# Patient Record
Sex: FEMALE | Race: WHITE | Employment: FULL TIME | URBAN - METROPOLITAN AREA
[De-identification: names, ages, dates, MRNs, and addresses within clinical notes are randomized per-mention and may not be internally consistent; named-entity substitution may affect disease eponyms.]

---

## 2017-01-10 ENCOUNTER — HOSPITAL ENCOUNTER (OUTPATIENT)
Dept: ULTRASOUND IMAGING | Age: 52
Discharge: HOME | End: 2017-01-10
Attending: INTERNAL MEDICINE
Payer: COMMERCIAL

## 2017-01-10 DIAGNOSIS — I65.21 CAROTID STENOSIS, RIGHT: ICD-10-CM

## 2017-01-10 PROCEDURE — 93880 EXTRACRANIAL BILAT STUDY: CPT

## 2017-04-12 ENCOUNTER — HOSPITAL ENCOUNTER (OUTPATIENT)
Dept: SURGERY | Age: 52
Discharge: HOME | End: 2017-04-12

## 2017-04-12 VITALS
HEIGHT: 64 IN | WEIGHT: 184.38 LBS | TEMPERATURE: 98.1 F | RESPIRATION RATE: 16 BRPM | DIASTOLIC BLOOD PRESSURE: 56 MMHG | BODY MASS INDEX: 31.48 KG/M2 | OXYGEN SATURATION: 98 % | SYSTOLIC BLOOD PRESSURE: 126 MMHG

## 2017-04-12 NOTE — PERIOP NOTES
Patient verified name, , and surgery as listed in [facility]. TYPE  CASE:1b  Orders per surgeon: were not Received  Labs per surgeon:none  Labs per anesthesia protocol: none   EKG  :  Ekg 17 with comparison EKG 01/06/15, Echo 12, last office note 17. Patient provided with handouts including guide to surgery , transfusions, pain management and hand hygiene for the family and community. Pt verbalizes understanding of all pre-op instructions . Instructed that family must be present in building at all times. Hibiclens and instructions given per hospital policy. Instructed patient to continue  previous medications as prescribed prior to surgery and  to take the following medications the day of surgery according to anesthesia guidelines : Synthroid       Original medication prescription bottles were not visualized during patient appointment. Continue all previous medications unless otherwise directed. Instructed patient to hold  the following medications prior to surgery: none      Patient verbalized understanding of all instructions and provided all medical/health information to the best of their ability.

## 2017-04-18 NOTE — PERIOP NOTES
Called and left message for Elizabeth Doctor  To please fax orders and H&P to [phone]  Consent orders in sign and held

## 2017-04-18 NOTE — H&P
History and Physical    Subjective:     Glenys Hampton is a 46 y.o.  female who presents with ptosis. Onset of symptoms was gradual with gradually worsening course since that time. 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[facility])     surgically removed    Fatty liver     H/O wheezing     usually after a respiratory infection, uses prn inhaler    Heart palpitations     occassionally with no treatment     Hx gestational diabetes     no symptoms after childbirth    Hypercholesterolemia     managed with medication     Hypothyroidism     managed with medication     IBS (irritable bowel syndrome)     managed with diet and prn medication     Osteoarthritis     fingers     PUD (peptic ulcer disease) last     Gastric ulcers/duodenal carcinoid, 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    Splenic artery aneurysm ([facility] [address] 2015    5/21/15-  1 cm, Vascular consulted, no treatment at this time.       Past Surgical History:   Procedure Laterality Date    HX BREAST REDUCTION  2014    BILATERAL BREAST REDUCTION performed by Jaxon Souza MD at [address] HX  SECTION  83, 80    x 2    HX COLONOSCOPY      HX ENDOSCOPY  2015    HX HEENT      jaw surgery x2    HX HYSTERECTOMY  2008    complete with bso    HX LAP CHOLECYSTECTOMY      HX ORTHOPAEDIC Right     middle finger surgery joint    HX SHOULDER ARTHROSCOPY Right 2013         HX TONSIL AND ADENOIDECTOMY       Family History   Problem Relation Age of Onset    Heart Disease Mother     Arthritis-osteo Mother     COPD Mother     Stroke Mother     Diabetes Father     Heart Disease Father     Hypertension Father     No Known Problems Brother       Social History   Substance Use Topics    Smoking status: Former Smoker     Packs/day: 0.50     Years: 5.00     Quit date: 8/10/1980    Smokeless tobacco: Never Used      Comment: quit 25 years ago / in her 19's    Alcohol use No       Prior to Admission medications    Medication Sig Start Date End Date Taking? Authorizing Provider   levothyroxine (SYNTHROID) 88 mcg tablet Take 88 mcg by mouth Daily (before breakfast). Historical Provider   alosetron (LOTRONEX) 0.5 mg tab Take  by mouth as needed. Indications: DIARRHEA PREDOMINANT IRRITABLE BOWEL SYNDROME    Historical Provider   fenofibric acid (TRILIPIX) 45 mg capsule Take 45 mg by mouth every morning. Indications: hypercholesterolemia    Phys Other, MD   colestipol (COLESTID) 1 gram tablet Take 1 g by mouth every evening. Historical Provider     Allergies   Allergen Reactions    Erythromycin Nausea and Vomiting    Penicillins Hives        Review of Systems:  A comprehensive review of systems was negative. Objective: Intake and Output:            Physical Exam:   There were no vitals taken for this visit. General:  Alert, cooperative, no distress, appears stated age. Head:  Normocephalic, without obvious abnormality, atraumatic. Eyes:  Conjunctivae/corneas clear. PERRL, EOMs intact. Fundi benign. Ears:  Normal TMs and external ear canals both ears. Nose: Nares normal. Septum midline. Mucosa normal. No drainage or sinus tenderness. Throat: Lips, mucosa, and tongue normal. Teeth and gums normal.   Neck: Supple, symmetrical, trachea midline, no adenopathy, thyroid: no enlargement/tenderness/nodules, no carotid bruit and no JVD. Back:   Symmetric, no curvature. ROM normal. No CVA tenderness. Lungs:   Clear to auscultation bilaterally. Chest wall:  No tenderness or deformity. Heart:  Regular rate and rhythm, S1, S2 normal, no murmur, click, rub or gallop. Breast Exam:  No tenderness, masses, or nipple abnormality. Abdomen:   Soft, non-tender. Bowel sounds normal. No masses,  No organomegaly. Genitalia:  Normal female without lesion, discharge or tenderness. Rectal:  Normal tone,  no masses or tenderness  Guaiac negative stool. Extremities: Extremities normal, atraumatic, no cyanosis or edema. Pulses: 2+ and symmetric all extremities. Skin: Skin color, texture, turgor normal. No rashes or lesions. Lymph nodes: Cervical, supraclavicular, and axillary nodes normal.   Neurologic: CNII-XII intact. Normal strength, sensation and reflexes throughout. Data Review:   No results found for this or any previous visit (from the past 24 hour(s)). Assessment:     Active Problems:    * No active hospital problems.  *      Plan:     Ptosis repair both upper eyelids    Signed By: Ness Flannery DO     April 18, 2017

## 2017-04-19 ENCOUNTER — HOSPITAL ENCOUNTER (OUTPATIENT)
Dept: MAMMOGRAPHY | Age: 52
Discharge: HOME | End: 2017-04-19
Attending: INTERNAL MEDICINE
Payer: COMMERCIAL

## 2017-04-19 DIAGNOSIS — Z12.31 VISIT FOR SCREENING MAMMOGRAM: ICD-10-CM

## 2017-04-19 PROCEDURE — 77067 SCR MAMMO BI INCL CAD: CPT

## 2017-04-20 ENCOUNTER — ANESTHESIA EVENT (OUTPATIENT)
Dept: SURGERY | Age: 52
End: 2017-04-20
Payer: COMMERCIAL

## 2017-04-20 RX ORDER — FENTANYL CITRATE 50 UG/ML
100 INJECTION, SOLUTION INTRAMUSCULAR; INTRAVENOUS ONCE
Status: CANCELLED | OUTPATIENT
Start: 2017-04-20 | End: 2017-04-20

## 2017-04-20 NOTE — PERIOP NOTES
Pt states she is supposed to have Sedonia Lyell with her tomorrow. Carlos PIRES spoke to pt concerning this matter.

## 2017-04-21 ENCOUNTER — ANESTHESIA (OUTPATIENT)
Dept: SURGERY | Age: 52
End: 2017-04-21
Payer: COMMERCIAL

## 2017-04-21 ENCOUNTER — HOSPITAL ENCOUNTER (OUTPATIENT)
Age: 52
Setting detail: OUTPATIENT SURGERY
Discharge: HOME | End: 2017-04-21
Attending: OPHTHALMOLOGY | Admitting: OPHTHALMOLOGY
Payer: COMMERCIAL

## 2017-04-21 VITALS
BODY MASS INDEX: 31.3 KG/M2 | DIASTOLIC BLOOD PRESSURE: 75 MMHG | RESPIRATION RATE: 12 BRPM | HEART RATE: 68 BPM | SYSTOLIC BLOOD PRESSURE: 129 MMHG | OXYGEN SATURATION: 96 % | TEMPERATURE: 98 F | WEIGHT: 182.38 LBS

## 2017-04-21 PROCEDURE — 77030020851 HC CAUT BTRY HI AARM -A: Performed by: OPHTHALMOLOGY

## 2017-04-21 PROCEDURE — 76210000020 HC REC RM PH II FIRST 0.5 HR: Performed by: OPHTHALMOLOGY

## 2017-04-21 PROCEDURE — 76010000138 HC OR TIME 0.5 TO 1 HR: Performed by: OPHTHALMOLOGY

## 2017-04-21 PROCEDURE — 74011250636 HC RX REV CODE- 250/636

## 2017-04-21 PROCEDURE — 74011000250 HC RX REV CODE- 250: Performed by: OPHTHALMOLOGY

## 2017-04-21 PROCEDURE — 74011250636 HC RX REV CODE- 250/636: Performed by: ANESTHESIOLOGY

## 2017-04-21 PROCEDURE — 76210000063 HC OR PH I REC FIRST 0.5 HR: Performed by: OPHTHALMOLOGY

## 2017-04-21 PROCEDURE — 77030002996 HC SUT SLK J&J -A: Performed by: OPHTHALMOLOGY

## 2017-04-21 PROCEDURE — 77030002975 HC SUT PLN J&J -B: Performed by: OPHTHALMOLOGY

## 2017-04-21 PROCEDURE — 77030013474 HC CRD BPLR DISP ADLR -A: Performed by: OPHTHALMOLOGY

## 2017-04-21 PROCEDURE — 76060000033 HC ANESTHESIA 1 TO 1.5 HR: Performed by: OPHTHALMOLOGY

## 2017-04-21 PROCEDURE — 77030018836 HC SOL IRR NACL ICUM -A: Performed by: OPHTHALMOLOGY

## 2017-04-21 PROCEDURE — 77030003029 HC SUT VCRL J&J -B: Performed by: OPHTHALMOLOGY

## 2017-04-21 PROCEDURE — 74011000250 HC RX REV CODE- 250

## 2017-04-21 RX ORDER — ACETAMINOPHEN 500 MG
1000 TABLET ORAL ONCE
Status: DISCONTINUED | OUTPATIENT
Start: 2017-04-21 | End: 2017-04-21 | Stop reason: HOSPADM

## 2017-04-21 RX ORDER — MIDAZOLAM HYDROCHLORIDE 1 MG/ML
INJECTION, SOLUTION INTRAMUSCULAR; INTRAVENOUS AS NEEDED
Status: DISCONTINUED | OUTPATIENT
Start: 2017-04-21 | End: 2017-04-21 | Stop reason: HOSPADM

## 2017-04-21 RX ORDER — ONDANSETRON 2 MG/ML
INJECTION INTRAMUSCULAR; INTRAVENOUS AS NEEDED
Status: DISCONTINUED | OUTPATIENT
Start: 2017-04-21 | End: 2017-04-21 | Stop reason: HOSPADM

## 2017-04-21 RX ORDER — LIDOCAINE HYDROCHLORIDE 10 MG/ML
0.1 INJECTION INFILTRATION; PERINEURAL AS NEEDED
Status: DISCONTINUED | OUTPATIENT
Start: 2017-04-21 | End: 2017-04-21 | Stop reason: HOSPADM

## 2017-04-21 RX ORDER — OXYCODONE HYDROCHLORIDE 5 MG/1
10 TABLET ORAL
Status: DISCONTINUED | OUTPATIENT
Start: 2017-04-21 | End: 2017-04-21 | Stop reason: HOSPADM

## 2017-04-21 RX ORDER — SODIUM CHLORIDE 0.9 % (FLUSH) 0.9 %
5-10 SYRINGE (ML) INJECTION AS NEEDED
Status: DISCONTINUED | OUTPATIENT
Start: 2017-04-21 | End: 2017-04-21 | Stop reason: HOSPADM

## 2017-04-21 RX ORDER — HYDROMORPHONE HYDROCHLORIDE 2 MG/ML
0.5 INJECTION, SOLUTION INTRAMUSCULAR; INTRAVENOUS; SUBCUTANEOUS
Status: DISCONTINUED | OUTPATIENT
Start: 2017-04-21 | End: 2017-04-21 | Stop reason: HOSPADM

## 2017-04-21 RX ORDER — SODIUM CHLORIDE 0.9 % (FLUSH) 0.9 %
5-10 SYRINGE (ML) INJECTION EVERY 8 HOURS
Status: DISCONTINUED | OUTPATIENT
Start: 2017-04-21 | End: 2017-04-21 | Stop reason: HOSPADM

## 2017-04-21 RX ORDER — BUPIVACAINE HYDROCHLORIDE AND EPINEPHRINE 5; 5 MG/ML; UG/ML
INJECTION, SOLUTION EPIDURAL; INTRACAUDAL; PERINEURAL AS NEEDED
Status: DISCONTINUED | OUTPATIENT
Start: 2017-04-21 | End: 2017-04-21 | Stop reason: HOSPADM

## 2017-04-21 RX ORDER — PROPOFOL 10 MG/ML
INJECTION, EMULSION INTRAVENOUS AS NEEDED
Status: DISCONTINUED | OUTPATIENT
Start: 2017-04-21 | End: 2017-04-21 | Stop reason: HOSPADM

## 2017-04-21 RX ORDER — FENTANYL CITRATE 50 UG/ML
INJECTION, SOLUTION INTRAMUSCULAR; INTRAVENOUS AS NEEDED
Status: DISCONTINUED | OUTPATIENT
Start: 2017-04-21 | End: 2017-04-21 | Stop reason: HOSPADM

## 2017-04-21 RX ORDER — OXYCODONE HYDROCHLORIDE 5 MG/1
5 TABLET ORAL
Status: DISCONTINUED | OUTPATIENT
Start: 2017-04-21 | End: 2017-04-21 | Stop reason: HOSPADM

## 2017-04-21 RX ORDER — SODIUM CHLORIDE, SODIUM LACTATE, POTASSIUM CHLORIDE, CALCIUM CHLORIDE 600; 310; 30; 20 MG/100ML; MG/100ML; MG/100ML; MG/100ML
100 INJECTION, SOLUTION INTRAVENOUS CONTINUOUS
Status: DISCONTINUED | OUTPATIENT
Start: 2017-04-21 | End: 2017-04-21 | Stop reason: HOSPADM

## 2017-04-21 RX ORDER — FLUMAZENIL 0.1 MG/ML
0.2 INJECTION INTRAVENOUS
Status: DISCONTINUED | OUTPATIENT
Start: 2017-04-21 | End: 2017-04-21 | Stop reason: HOSPADM

## 2017-04-21 RX ORDER — NALOXONE HYDROCHLORIDE 0.4 MG/ML
0.2 INJECTION, SOLUTION INTRAMUSCULAR; INTRAVENOUS; SUBCUTANEOUS AS NEEDED
Status: DISCONTINUED | OUTPATIENT
Start: 2017-04-21 | End: 2017-04-21 | Stop reason: HOSPADM

## 2017-04-21 RX ORDER — LIDOCAINE HYDROCHLORIDE AND EPINEPHRINE 10; 10 MG/ML; UG/ML
INJECTION, SOLUTION INFILTRATION; PERINEURAL AS NEEDED
Status: DISCONTINUED | OUTPATIENT
Start: 2017-04-21 | End: 2017-04-21 | Stop reason: HOSPADM

## 2017-04-21 RX ORDER — MIDAZOLAM HYDROCHLORIDE 1 MG/ML
2 INJECTION, SOLUTION INTRAMUSCULAR; INTRAVENOUS ONCE
Status: DISCONTINUED | OUTPATIENT
Start: 2017-04-21 | End: 2017-04-21 | Stop reason: HOSPADM

## 2017-04-21 RX ORDER — LIDOCAINE HYDROCHLORIDE 20 MG/ML
INJECTION, SOLUTION EPIDURAL; INFILTRATION; INTRACAUDAL; PERINEURAL AS NEEDED
Status: DISCONTINUED | OUTPATIENT
Start: 2017-04-21 | End: 2017-04-21 | Stop reason: HOSPADM

## 2017-04-21 RX ORDER — DIPHENHYDRAMINE HYDROCHLORIDE 50 MG/ML
12.5 INJECTION, SOLUTION INTRAMUSCULAR; INTRAVENOUS
Status: DISCONTINUED | OUTPATIENT
Start: 2017-04-21 | End: 2017-04-21 | Stop reason: HOSPADM

## 2017-04-21 RX ORDER — MIDAZOLAM HYDROCHLORIDE 1 MG/ML
2 INJECTION, SOLUTION INTRAMUSCULAR; INTRAVENOUS
Status: DISCONTINUED | OUTPATIENT
Start: 2017-04-21 | End: 2017-04-21 | Stop reason: HOSPADM

## 2017-04-21 RX ADMIN — SODIUM CHLORIDE, SODIUM LACTATE, POTASSIUM CHLORIDE, AND CALCIUM CHLORIDE 100 ML/HR: 600; 310; 30; 20 INJECTION, SOLUTION INTRAVENOUS at 13:17

## 2017-04-21 RX ADMIN — LIDOCAINE HYDROCHLORIDE 30 MG: 20 INJECTION, SOLUTION EPIDURAL; INFILTRATION; INTRACAUDAL; PERINEURAL at 14:11

## 2017-04-21 RX ADMIN — PROPOFOL 30 MG: 10 INJECTION, EMULSION INTRAVENOUS at 14:32

## 2017-04-21 RX ADMIN — PROPOFOL 50 MG: 10 INJECTION, EMULSION INTRAVENOUS at 14:13

## 2017-04-21 RX ADMIN — PROPOFOL 30 MG: 10 INJECTION, EMULSION INTRAVENOUS at 14:30

## 2017-04-21 RX ADMIN — FENTANYL CITRATE 25 MCG: 50 INJECTION, SOLUTION INTRAMUSCULAR; INTRAVENOUS at 14:59

## 2017-04-21 RX ADMIN — MIDAZOLAM HYDROCHLORIDE 0.5 MG: 1 INJECTION, SOLUTION INTRAMUSCULAR; INTRAVENOUS at 14:55

## 2017-04-21 RX ADMIN — PROPOFOL 100 MG: 10 INJECTION, EMULSION INTRAVENOUS at 14:11

## 2017-04-21 RX ADMIN — FENTANYL CITRATE 25 MCG: 50 INJECTION, SOLUTION INTRAMUSCULAR; INTRAVENOUS at 14:54

## 2017-04-21 RX ADMIN — ONDANSETRON 4 MG: 2 INJECTION INTRAMUSCULAR; INTRAVENOUS at 14:47

## 2017-04-21 NOTE — OP NOTES
PREOPERATIVE DIAGNOSIS:  Ptosis both upper eyelids with visual obstruction, and excess      eyelid skin  POSTOPERATIVE DIAGNOSIS: Same  PROCEDURE:    Repair ptosis of both upper eyelids (external levator technique)      and excision of excess skin  SURGEON:    Raul Davidson D.O. ANESTHESIA:    MAC / Local  BLOOD LOSS:    < 2 CC  COMPLICATIONS:   None    Description of Procedure: The patient was taken to the operating room to repair ptosis of the both upper eyelids causing visual obstruction. The procedure was reviewed in detail with the patient including risks, complications, benefits, and goals. Informed consent was obtained after all questions were answered. Time out procedure was carried out to confirm proper surgical site. The lid crease positions were marked with a marking pen while the patient was sitting up. The patient was placed supine and 1.0% Lidocaine with epinephrine was mixed in a 1:1 ratio with 0.5% Marcaine with epinephrine and this was injected as local anesthesia. The patient was then prepped and draped in the usual fashion for ocular surgery. After the patient was placed in supine position, a #15 blade was used to make an incision in the right upper lid. This was carried into the orbicularis muscle. The skin edges were tented and Amrita scissors were used to make an incision through the orbicularis muscle. Dissection was then carried through the septum exposing the levator aponeurosis. The levator was noted to be disinserted. The pretarsal orbicularis muscle was then excised using Amrita scissors. Bleeding was controlled easily using handheld and bipolar cautery. The levator was dissected free of Daniel's muscle, and using a double-armed, broad-based, 6-0 Mersiline suture,  the levator was reattached to the tarsal plate. A skin muscle flap was then removed using Amrita scissors with attention turned to symmetry and contour for the eyelids.     Attention was turned to the left upper lid where the procedure was carried out in a very similar fashion. After the sutures were applied, the patient was assisted into a sitting position. Here the lid heights and contours were checked and adjusted by changing tension on the sutures. Once the appropriate heights and contours of the lids were achieved, the suture was tied and the tails cut. The skin edges were then closed using multiple 6-0 plain gut interrupted and running sutures. Deep bites were taken to ensure a very good lid crease. Tobradex ointment and a sterile dressing were applied to the wounds, and the patient was taken to recovery in stable condition. Discharge instructions were given, and the patient will be seen in the office one week following surgery for evaluation.

## 2017-04-21 NOTE — DISCHARGE INSTRUCTIONS
Donato Funez D.O. [facility] [employer]  [phone]    INSTRUCTIONS AFTER YOUR SURGERY      1. For the first 24 hours after surgery:   A. Use ice packs for 15 - 20 minutes every hour until bedtime, then as needed. B.  Do not lift heavy objects or place a strain on your body. C.  Do not engage in important business, drink alcoholic beverages, or drive. Taylor Burnside your head elevated for the first evening or two.   E.  Have a friend or relative with you. F.  Do not rub your eyes    2. Call Dr. Shivani Marinelli if any of the following occur:   A. Sudden loss of vision. Cover one eye at a time to check vision. B.  Excessive bleeding or blood-soaked bandage. NOTE:  Small amounts of bleeding or blood-tinged tears are normal.   C.  Redness, warmth, or hardness around the wound. D.  Fever greater than 100°F.  E.  Pain not relieved by Tylenol, Ibuprofen, or pain relievers prescribed by               Doctor Chen. 3.  During the next few days after surgery:   A. Use ice packs or cold moist compresses several times a day. B.  Resume your normal activities VERY SLOWLY, no heavy exercise. C.  Resume your normal diet; avoid overly spicy or greasy foods for a few days. D.  Resume taking your normal prescription medications and/or eyedrops. Dylan Juarez the day after surgery, clean shower water will not hurt the wounds. Camden López may wear makeup after 7 - 10 days on the incisions, not before. G.  Remove the bandage or dressing in    hours. H.  Use the following medications carefully and as directed:         Use eye ointment on stitches twice a day. NOTE:  eye ointment may cause blurry vision. .     I. Special instructions:                              .    4. Your postop visit with Dr. Shivani Marinelli is scheduled on 04/27/17  at 2:45 pm at his Office.   Call our office if you have any questions, concerns, or problems after your surgery .    [phone]  [phone] [facility]

## 2017-04-21 NOTE — ANESTHESIA POSTPROCEDURE EVALUATION
Post-Anesthesia Evaluation and Assessment    Patient: Enrique Kirkpatrick MRN: [id number]  SSN: [id number]    YOB: 1965  Age: 46 y.o. Sex: female       Cardiovascular Function/Vital Signs  Visit Vitals    /70    Pulse 72    Temp 36.7 °C (98 °F)    Resp 14    Wt 82.7 kg (182 lb 6 oz)    SpO2 93%    BMI 31.3 kg/m2       Patient is status post total IV anesthesia anesthesia for Procedure(s):  BLEPHAROPTOSIS BILATERAL/. Nausea/Vomiting: None    Postoperative hydration reviewed and adequate. Pain:  Pain Scale 1: Numeric (0 - 10) (04/21/17 1513)  Pain Intensity 1: 0 (04/21/17 1513)   Managed    Neurological Status:   Neuro (WDL): Within Defined Limits (04/21/17 1513)  Neuro  LUE Motor Response: Purposeful (04/21/17 1513)  LLE Motor Response: Purposeful (04/21/17 1513)  RUE Motor Response: Purposeful (04/21/17 1513)  RLE Motor Response: Purposeful (04/21/17 1513)   At baseline    Mental Status and Level of Consciousness: Alert and oriented     Pulmonary Status:   O2 Device: Room air (04/21/17 1513)   Adequate oxygenation and airway patent    Complications related to anesthesia: None    Post-anesthesia assessment completed.  No concerns    Signed By: Heather Solomon MD     April 21, 2017

## 2017-04-21 NOTE — INTERVAL H&P NOTE
H&P Update:  Lor Davies was seen and examined. History and physical has been reviewed. The patient has been examined.  There have been no significant clinical changes since the completion of the originally dated History and Physical.    Signed By: Davon Narayanan DO     April 21, 2017 2:01 PM

## 2017-05-04 ENCOUNTER — HOSPITAL ENCOUNTER (OUTPATIENT)
Dept: LAB | Age: 52
Discharge: HOME | End: 2017-05-04
Attending: INTERNAL MEDICINE
Payer: COMMERCIAL

## 2017-05-04 ENCOUNTER — HOSPITAL ENCOUNTER (OUTPATIENT)
Dept: CT IMAGING | Age: 52
Discharge: HOME | End: 2017-05-04
Attending: INTERNAL MEDICINE
Payer: SELF-PAY

## 2017-05-04 DIAGNOSIS — R07.2 PRECORDIAL CHEST PAIN: ICD-10-CM

## 2017-05-04 DIAGNOSIS — R01.1 MURMUR, CARDIAC: ICD-10-CM

## 2017-05-04 DIAGNOSIS — R01.1 MURMUR: ICD-10-CM

## 2017-05-04 LAB
ANION GAP BLD CALC-SCNC: 6 MMOL/L
BASOPHILS # BLD AUTO: 0 K/UL (ref 0–0.2)
BASOPHILS # BLD: 1 % (ref 0–2)
BUN SERPL-MCNC: 18 MG/DL (ref 6–23)
CALCIUM SERPL-MCNC: 8.9 MG/DL (ref 8.3–10.4)
CHLORIDE SERPL-SCNC: 108 MMOL/L (ref 98–107)
CHOLEST SERPL-MCNC: 237 MG/DL
CO2 SERPL-SCNC: 28 MMOL/L (ref 21–32)
CREAT SERPL-MCNC: 0.9 MG/DL (ref 0.6–1)
DIFFERENTIAL METHOD BLD: ABNORMAL
EOSINOPHIL # BLD: 0.2 K/UL (ref 0–0.8)
EOSINOPHIL NFR BLD: 5 % (ref 0.5–7.8)
ERYTHROCYTE [DISTWIDTH] IN BLOOD BY AUTOMATED COUNT: 13.8 % (ref 11.9–14.6)
EST. AVERAGE GLUCOSE BLD GHB EST-MCNC: 123 MG/DL
GLUCOSE SERPL-MCNC: 111 MG/DL (ref 65–100)
HBA1C MFR BLD: 5.9 % (ref 4.8–6)
HCT VFR BLD AUTO: 40.9 % (ref 35.8–46.3)
HDLC SERPL-MCNC: 34 MG/DL (ref 40–60)
HDLC SERPL: 7 {RATIO}
HGB BLD-MCNC: 12.9 G/DL (ref 11.7–15.4)
LDLC SERPL CALC-MCNC: ABNORMAL MG/DL
LDLC SERPL DIRECT ASSAY-MCNC: 143 MG/DL
LIPID PROFILE,FLP: ABNORMAL
LYMPHOCYTES # BLD AUTO: 47 % (ref 13–44)
LYMPHOCYTES # BLD: 1.8 K/UL (ref 0.5–4.6)
MCH RBC QN AUTO: 27.9 PG (ref 26.1–32.9)
MCHC RBC AUTO-ENTMCNC: 31.5 G/DL (ref 31.4–35)
MCV RBC AUTO: 88.5 FL (ref 79.6–97.8)
MONOCYTES # BLD: 0.4 K/UL (ref 0.1–1.3)
MONOCYTES NFR BLD AUTO: 11 % (ref 4–12)
NEUTS SEG # BLD: 1.4 K/UL (ref 1.7–8.2)
NEUTS SEG NFR BLD AUTO: 36 % (ref 43–78)
PLATELET # BLD AUTO: 228 K/UL (ref 150–450)
PMV BLD AUTO: 10.8 FL (ref 10.8–14.1)
POTASSIUM SERPL-SCNC: 4 MMOL/L (ref 3.5–5.1)
RBC # BLD AUTO: 4.62 M/UL (ref 4.05–5.25)
SODIUM SERPL-SCNC: 142 MMOL/L (ref 136–145)
T4 FREE SERPL-MCNC: 1.3 NG/DL (ref 0.78–1.46)
TRIGL SERPL-MCNC: 440 MG/DL (ref 35–150)
TSH SERPL DL<=0.005 MIU/L-ACNC: 1.44 UIU/ML (ref 0.36–3.74)
VLDLC SERPL CALC-MCNC: 88 MG/DL (ref 6–23)
WBC # BLD AUTO: 3.9 K/UL (ref 4.3–11.1)

## 2017-05-04 PROCEDURE — 80048 BASIC METABOLIC PNL TOTAL CA: CPT | Performed by: INTERNAL MEDICINE

## 2017-05-04 PROCEDURE — 84443 ASSAY THYROID STIM HORMONE: CPT | Performed by: INTERNAL MEDICINE

## 2017-05-04 PROCEDURE — 84439 ASSAY OF FREE THYROXINE: CPT | Performed by: INTERNAL MEDICINE

## 2017-05-04 PROCEDURE — 83721 ASSAY OF BLOOD LIPOPROTEIN: CPT | Performed by: INTERNAL MEDICINE

## 2017-05-04 PROCEDURE — 82306 VITAMIN D 25 HYDROXY: CPT | Performed by: INTERNAL MEDICINE

## 2017-05-04 PROCEDURE — 36415 COLL VENOUS BLD VENIPUNCTURE: CPT | Performed by: INTERNAL MEDICINE

## 2017-05-04 PROCEDURE — 75571 CT HRT W/O DYE W/CA TEST: CPT

## 2017-05-04 PROCEDURE — 80061 LIPID PANEL: CPT | Performed by: INTERNAL MEDICINE

## 2017-05-04 PROCEDURE — 85025 COMPLETE CBC W/AUTO DIFF WBC: CPT | Performed by: INTERNAL MEDICINE

## 2017-05-04 PROCEDURE — 83036 HEMOGLOBIN GLYCOSYLATED A1C: CPT | Performed by: INTERNAL MEDICINE

## 2017-05-05 LAB — 25(OH)D3+25(OH)D2 SERPL-MCNC: 16.6 NG/ML (ref 30–100)

## 2017-05-17 ENCOUNTER — APPOINTMENT (OUTPATIENT)
Dept: PHYSICAL THERAPY | Age: 52
End: 2017-05-17

## 2017-05-24 ENCOUNTER — APPOINTMENT (OUTPATIENT)
Dept: PHYSICAL THERAPY | Age: 52
End: 2017-05-24

## 2017-07-25 ENCOUNTER — HOSPITAL ENCOUNTER (OUTPATIENT)
Dept: MRI IMAGING | Age: 52
Discharge: HOME | End: 2017-07-25
Attending: INTERNAL MEDICINE
Payer: COMMERCIAL

## 2017-07-25 DIAGNOSIS — R51.9 GENERALIZED HEADACHE: ICD-10-CM

## 2017-07-25 PROCEDURE — 70551 MRI BRAIN STEM W/O DYE: CPT

## 2017-12-04 ENCOUNTER — PATIENT OUTREACH (OUTPATIENT)
Dept: OTHER | Age: 52
End: 2017-12-04

## 2017-12-04 NOTE — PROGRESS NOTES
Patient on report as eligible for Case Management. Left discreet message on voicemail with this CM contact information. Will attempt to contact again to offer [phone] Management services.

## 2017-12-07 ENCOUNTER — HOSPITAL ENCOUNTER (OUTPATIENT)
Dept: ULTRASOUND IMAGING | Age: 52
Discharge: HOME | End: 2017-12-07
Attending: INTERNAL MEDICINE
Payer: COMMERCIAL

## 2017-12-07 DIAGNOSIS — R79.89 ABNORMAL LFTS: ICD-10-CM

## 2017-12-07 PROCEDURE — 76700 US EXAM ABDOM COMPLETE: CPT

## 2017-12-13 ENCOUNTER — PATIENT OUTREACH (OUTPATIENT)
Dept: OTHER | Age: 52
End: 2017-12-13

## 2017-12-13 NOTE — LETTER
12/13/2017 1:17 PM 
 
Ms. Enma Casas [address] [facility] [place] [address] Dear Ms. Winifred Cranker, My name is Patricia Rodrigez RN, Employee Care Manager for [facility] and I have been trying to reach you. The Employee Care  is a free-of-charge confidential service provided to our employees and their family members covered by the [facility]. The program will provide an employee and his/her family with the [facility] expertise to assist in navigating the health care delivery system, provider services, and their overall care needsso as to assure and improve health care interactions and enhance the quality of life. This program is designed to provide you with the opportunity to have a [facility] care manager partner with you for the following services: 
 
1.) Care transitions-such as when you come home from the hospital 
2.) When help is needed to manage your disease process 3.) When you are faced with managing a chronic or complex medical condition [facility] is dedicated to empowering the good health of its community and improving the quality of care and care experiences for employees and their families. We are commited to safeguarding patient confidentiality and privacy,  assuring that every employee has the respect he or she deserves in managing their health. The information shared with your care manager will not be shared with anyone else aside from those you identify as part of your care team, and will only be used to assist you with any identified care needs. Please contact me if you would like this service provided to you. Sincerely, Patricia Rodrigez RN, BSN  Employee Care Manager [address] [email or web address]

## 2017-12-13 NOTE — PROGRESS NOTES
Patient identified as eligible for [address] [place] [address] services. Second telephone outreach attempted. Left discreet voicemail with this CM confidential contact information. Will send UTR letter.

## 2018-01-10 ENCOUNTER — ANESTHESIA EVENT (OUTPATIENT)
Dept: ENDOSCOPY | Age: 53
End: 2018-01-10
Payer: COMMERCIAL

## 2018-01-10 RX ORDER — DIPHENHYDRAMINE HYDROCHLORIDE 50 MG/ML
12.5 INJECTION, SOLUTION INTRAMUSCULAR; INTRAVENOUS ONCE
Status: CANCELLED | OUTPATIENT
Start: 2018-01-10 | End: 2018-01-10

## 2018-01-10 RX ORDER — SODIUM CHLORIDE 0.9 % (FLUSH) 0.9 %
5-10 SYRINGE (ML) INJECTION AS NEEDED
Status: CANCELLED | OUTPATIENT
Start: 2018-01-10

## 2018-01-10 RX ORDER — ACETAMINOPHEN 500 MG
500 TABLET ORAL ONCE
Status: CANCELLED | OUTPATIENT
Start: 2018-01-10 | End: 2018-01-10

## 2018-01-10 RX ORDER — OXYCODONE HYDROCHLORIDE 5 MG/1
10 TABLET ORAL
Status: CANCELLED | OUTPATIENT
Start: 2018-01-10

## 2018-01-10 RX ORDER — OXYCODONE HYDROCHLORIDE 5 MG/1
5 TABLET ORAL
Status: CANCELLED | OUTPATIENT
Start: 2018-01-10

## 2018-01-10 RX ORDER — SODIUM CHLORIDE, SODIUM LACTATE, POTASSIUM CHLORIDE, CALCIUM CHLORIDE 600; 310; 30; 20 MG/100ML; MG/100ML; MG/100ML; MG/100ML
75 INJECTION, SOLUTION INTRAVENOUS CONTINUOUS
Status: CANCELLED | OUTPATIENT
Start: 2018-01-10

## 2018-01-10 RX ORDER — HYDROMORPHONE HYDROCHLORIDE 2 MG/ML
0.5 INJECTION, SOLUTION INTRAMUSCULAR; INTRAVENOUS; SUBCUTANEOUS
Status: CANCELLED | OUTPATIENT
Start: 2018-01-10

## 2018-01-10 RX ORDER — ONDANSETRON 2 MG/ML
4 INJECTION INTRAMUSCULAR; INTRAVENOUS ONCE
Status: CANCELLED | OUTPATIENT
Start: 2018-01-10 | End: 2018-01-10

## 2018-01-10 RX ORDER — NALOXONE HYDROCHLORIDE 0.4 MG/ML
0.1 INJECTION, SOLUTION INTRAMUSCULAR; INTRAVENOUS; SUBCUTANEOUS AS NEEDED
Status: CANCELLED | OUTPATIENT
Start: 2018-01-10 | End: 2018-01-10

## 2018-01-11 ENCOUNTER — ANESTHESIA (OUTPATIENT)
Dept: ENDOSCOPY | Age: 53
End: 2018-01-11
Payer: COMMERCIAL

## 2018-01-11 ENCOUNTER — HOSPITAL ENCOUNTER (OUTPATIENT)
Age: 53
Setting detail: OUTPATIENT SURGERY
Discharge: HOME | End: 2018-01-11
Attending: INTERNAL MEDICINE | Admitting: INTERNAL MEDICINE
Payer: COMMERCIAL

## 2018-01-11 VITALS
BODY MASS INDEX: 30.73 KG/M2 | DIASTOLIC BLOOD PRESSURE: 55 MMHG | SYSTOLIC BLOOD PRESSURE: 118 MMHG | HEART RATE: 64 BPM | OXYGEN SATURATION: 94 % | HEIGHT: 64 IN | RESPIRATION RATE: 14 BRPM | TEMPERATURE: 97.7 F | WEIGHT: 180 LBS

## 2018-01-11 PROCEDURE — 76040000025: Performed by: INTERNAL MEDICINE

## 2018-01-11 PROCEDURE — 88305 TISSUE EXAM BY PATHOLOGIST: CPT | Performed by: INTERNAL MEDICINE

## 2018-01-11 PROCEDURE — 74011250636 HC RX REV CODE- 250/636: Performed by: ANESTHESIOLOGY

## 2018-01-11 PROCEDURE — 76060000031 HC ANESTHESIA FIRST 0.5 HR: Performed by: INTERNAL MEDICINE

## 2018-01-11 PROCEDURE — 74011250636 HC RX REV CODE- 250/636

## 2018-01-11 PROCEDURE — 74011000250 HC RX REV CODE- 250

## 2018-01-11 PROCEDURE — 77030009426 HC FCPS BIOP ENDOSC BSC -B: Performed by: INTERNAL MEDICINE

## 2018-01-11 RX ORDER — SODIUM CHLORIDE 0.9 % (FLUSH) 0.9 %
5-10 SYRINGE (ML) INJECTION AS NEEDED
Status: DISCONTINUED | OUTPATIENT
Start: 2018-01-11 | End: 2018-01-11 | Stop reason: HOSPADM

## 2018-01-11 RX ORDER — PROPOFOL 10 MG/ML
INJECTION, EMULSION INTRAVENOUS AS NEEDED
Status: DISCONTINUED | OUTPATIENT
Start: 2018-01-11 | End: 2018-01-11 | Stop reason: HOSPADM

## 2018-01-11 RX ORDER — SODIUM CHLORIDE 0.9 % (FLUSH) 0.9 %
5-10 SYRINGE (ML) INJECTION EVERY 8 HOURS
Status: DISCONTINUED | OUTPATIENT
Start: 2018-01-11 | End: 2018-01-11 | Stop reason: HOSPADM

## 2018-01-11 RX ORDER — PROPOFOL 10 MG/ML
INJECTION, EMULSION INTRAVENOUS
Status: DISCONTINUED | OUTPATIENT
Start: 2018-01-11 | End: 2018-01-11 | Stop reason: HOSPADM

## 2018-01-11 RX ORDER — FENTANYL CITRATE 50 UG/ML
100 INJECTION, SOLUTION INTRAMUSCULAR; INTRAVENOUS AS NEEDED
Status: DISCONTINUED | OUTPATIENT
Start: 2018-01-11 | End: 2018-01-11 | Stop reason: SDUPTHER

## 2018-01-11 RX ORDER — LIDOCAINE HYDROCHLORIDE 10 MG/ML
0.1 INJECTION INFILTRATION; PERINEURAL AS NEEDED
Status: DISCONTINUED | OUTPATIENT
Start: 2018-01-11 | End: 2018-01-11 | Stop reason: SDUPTHER

## 2018-01-11 RX ORDER — LIDOCAINE HYDROCHLORIDE 20 MG/ML
INJECTION, SOLUTION EPIDURAL; INFILTRATION; INTRACAUDAL; PERINEURAL AS NEEDED
Status: DISCONTINUED | OUTPATIENT
Start: 2018-01-11 | End: 2018-01-11 | Stop reason: HOSPADM

## 2018-01-11 RX ORDER — MIDAZOLAM HYDROCHLORIDE 1 MG/ML
2 INJECTION, SOLUTION INTRAMUSCULAR; INTRAVENOUS
Status: DISCONTINUED | OUTPATIENT
Start: 2018-01-11 | End: 2018-01-11 | Stop reason: HOSPADM

## 2018-01-11 RX ORDER — LIDOCAINE HYDROCHLORIDE 10 MG/ML
0.1 INJECTION INFILTRATION; PERINEURAL AS NEEDED
Status: DISCONTINUED | OUTPATIENT
Start: 2018-01-11 | End: 2018-01-11 | Stop reason: HOSPADM

## 2018-01-11 RX ORDER — SODIUM CHLORIDE, SODIUM LACTATE, POTASSIUM CHLORIDE, CALCIUM CHLORIDE 600; 310; 30; 20 MG/100ML; MG/100ML; MG/100ML; MG/100ML
1000 INJECTION, SOLUTION INTRAVENOUS CONTINUOUS
Status: DISCONTINUED | OUTPATIENT
Start: 2018-01-11 | End: 2018-01-11 | Stop reason: HOSPADM

## 2018-01-11 RX ORDER — MIDAZOLAM HYDROCHLORIDE 1 MG/ML
2 INJECTION, SOLUTION INTRAMUSCULAR; INTRAVENOUS
Status: DISCONTINUED | OUTPATIENT
Start: 2018-01-11 | End: 2018-01-11 | Stop reason: SDUPTHER

## 2018-01-11 RX ORDER — SODIUM CHLORIDE, SODIUM LACTATE, POTASSIUM CHLORIDE, CALCIUM CHLORIDE 600; 310; 30; 20 MG/100ML; MG/100ML; MG/100ML; MG/100ML
1000 INJECTION, SOLUTION INTRAVENOUS CONTINUOUS
Status: DISCONTINUED | OUTPATIENT
Start: 2018-01-11 | End: 2018-01-11 | Stop reason: SDUPTHER

## 2018-01-11 RX ORDER — FENTANYL CITRATE 50 UG/ML
100 INJECTION, SOLUTION INTRAMUSCULAR; INTRAVENOUS AS NEEDED
Status: DISCONTINUED | OUTPATIENT
Start: 2018-01-11 | End: 2018-01-11 | Stop reason: HOSPADM

## 2018-01-11 RX ADMIN — SODIUM CHLORIDE, SODIUM LACTATE, POTASSIUM CHLORIDE, AND CALCIUM CHLORIDE 1000 ML: 600; 310; 30; 20 INJECTION, SOLUTION INTRAVENOUS at 07:09

## 2018-01-11 RX ADMIN — PROPOFOL 200 MCG/KG/MIN: 10 INJECTION, EMULSION INTRAVENOUS at 07:35

## 2018-01-11 RX ADMIN — PROPOFOL 90 MG: 10 INJECTION, EMULSION INTRAVENOUS at 07:35

## 2018-01-11 RX ADMIN — LIDOCAINE HYDROCHLORIDE 40 MG: 20 INJECTION, SOLUTION EPIDURAL; INFILTRATION; INTRACAUDAL; PERINEURAL at 07:35

## 2018-01-11 NOTE — ROUTINE PROCESS
Pt. Discharged to car by Taylor Clifford with  . Vital signs stable. Able to tolerate PO fluids. Passing gas.  Seen by MD.

## 2018-01-11 NOTE — DISCHARGE INSTRUCTIONS
Gastrointestinal Esophagogastroduodenoscopy (EGD) - Upper Exam Discharge Instructions    1. Call Dr. Lisandra Palacios at [phone] for any problems or questions. 2. Contact the doctor's office for follow up appointment as directed. 3. Medication may cause drowsiness for several hours, therefore, do not drive or operate machinery for remainder of the day. 4. No alcohol today. 5. Ordinarily, you may resume regular diet and activity after exam unless otherwise specified by your physician. 6. For mild soreness in your throat you may use Cepacol throat lozenges or warm salt-water gargles as needed. Instructions given to Ebbie Asa and other family members.

## 2018-01-11 NOTE — ANESTHESIA POSTPROCEDURE EVALUATION
Post-Anesthesia Evaluation and Assessment    Patient: Maurice Coronel MRN: [id number]  SSN: [id number]    YOB: 1965  Age: 45 y.o. Sex: female       Cardiovascular Function/Vital Signs  Visit Vitals    /67    Pulse 68    Temp 36.5 °C (97.7 °F)    Resp 16    Ht 5' 4\" (1.626 m)    Wt 81.6 kg (180 lb)    SpO2 97%    BMI 30.9 kg/m2       Patient is status post total IV anesthesia anesthesia for Procedure(s):  ESOPHAGOGASTRODUODENOSCOPY (EGD)/ BMI=32  ESOPHAGOGASTRODUODENAL (EGD) BIOPSY. Nausea/Vomiting: None    Postoperative hydration reviewed and adequate. Pain:  Pain Scale 1: Numeric (0 - 10) (01/11/18 0656)  Pain Intensity 1: 0 (01/11/18 0656)   Managed    Neurological Status: At baseline    Mental Status and Level of Consciousness: Arousable    Pulmonary Status:   O2 Device: Nasal cannula (01/11/18 0748)   Adequate oxygenation and airway patent    Complications related to anesthesia: None    Post-anesthesia assessment completed.  No concerns    Signed By: Woo Wharton MD     January 11, 2018

## 2018-01-11 NOTE — OP NOTES
[address] [facility]  OPERATIVE REPORT    Daniel Sahu  MR#: [id number]  : 1965  ACCOUNT #: [de-identified]   DATE OF SERVICE: 2018        PROCEDURES PERFORMED:  Esophagogastroduodenoscopy. SURGEON:  Tashia Harvey MD    ESTIMATED BLOOD LOSS: 1 cc    SPECIMENS REMOVED: Duodenum    COMPLICATIONS: None    SUBJECTIVE:  A 49-year-old female is undergoing upper endoscopy because of history of duodenal carcinoid tumor. Upper endoscopy is done today to document the status of the previously noted small duodenal lesion which was removed endoscopically. The risks (bleeding, perforation, infection, untoward cardiovascular or respiratory event, mortality), benefits and alternatives, were discussed in detail with the patient, who agrees to proceed. ANESTHESIA:  As per MAC anesthesia. INSTRUMENT:  GIF-Q190 video endoscope. DESCRIPTION OF PROCEDURE:  The patient was placed in the supine position. The scope was passed through the upper pharynx and esophagus with minimal difficulty. Proximal, mid and distal esophagus were unremarkable. Once in stomach, the body and antrum were unremarkable. The retroflexed view of the angularis was normal.  A retroflexed view of the cardia revealed no other specific abnormalities. Attention turned to the duodenum. The pyloric channel was traversed without difficulty. The duodenal bulb and C loop were normal.  Specifically, I did not identify any duodenal lesions. For completeness, random duodenal biopsies were obtained. After documentation of hemostasis, the endoscope was brought back in stomach where air was decompressed. The endoscope was backed into the esophagus and out of the patient. Vocal cords appeared normal.  The patient tolerated the procedure well and was sent to the recovery area in stable condition. IMPRESSION  1. Normal study--duodenal biopsy obtained. 2.  No obvious recurrence of the duodenal carcinoid noted.     PLAN DIAGNOSTIC:  1. Follow up biopsies. 2.  Followup EGD in approximately 2 years, unless biopsies indicate otherwise. THERAPEUTIC:  1. Continue current medications. MD FAHAD Ceballos / [place]  D: 01/11/2018 07:49     T: 01/11/2018 08:24  JOB #: [id number]  CC:  ARMANDO SHIPLEY MD

## 2018-01-11 NOTE — H&P
Gastrointestinal Progress Note    1/11/2018    Admit Date: 1/11/2018    Subjective:     Patient is NPO for an EGD (history of a duodenal carcinoid). Pain: Patient complains of no abdominal pain. Bowel Movements: Diarrhea (chronic IBS D)    Bleeding:  None    Current Facility-Administered Medications   Medication Dose Route Frequency    lactated Ringers infusion 1,000 mL  1,000 mL IntraVENous CONTINUOUS    sodium chloride (NS) flush 5-10 mL  5-10 mL IntraVENous Q8H    sodium chloride (NS) flush 5-10 mL  5-10 mL IntraVENous PRN    lidocaine (XYLOCAINE) 10 mg/mL (1 %) injection 0.1 mL  0.1 mL SubCUTAneous PRN    lactated ringers bolus infusion 500 mL  500 mL IntraVENous ONCE    sodium chloride (NS) flush 5-10 mL  5-10 mL IntraVENous Q8H    sodium chloride (NS) flush 5-10 mL  5-10 mL IntraVENous PRN    fentaNYL citrate (PF) injection 100 mcg  100 mcg IntraVENous PRN    midazolam (VERSED) injection 2 mg  2 mg IntraVENous ONCE PRN        Objective:     Blood pressure 133/60, pulse 71, temperature 98.2 °F (36.8 °C), resp. rate 16, height 5' 4\" (1.626 m), weight 81.6 kg (180 lb), SpO2 98 %. EXAM:  HEART: regular rate and rhythm, S1, S2 normal, no murmur, click, rub or gallop, LUNGS: chest clear, no wheezing, rales, normal symmetric air entry, Heart exam - S1, S2 normal, no murmur, no gallop, rate regular and ABDOMEN:  Bowel sounds are normal, liver is not enlarged, spleen is not enlarged    Data Review  No results found for this or any previous visit (from the past 24 hour(s)). Assessment:     Active Problems:  History of a duodenal carcinoid        Plan:     EGD--risks (bleed, perforation, infection, untowared CV or resp. Event, mortality, etc.), benefits, and alternatives were discussed with the patient who agrees to proceed.   Levi Novoa MD

## 2018-01-11 NOTE — ANESTHESIA PREPROCEDURE EVALUATION
Anesthetic History               Review of Systems / Medical History  Patient summary reviewed    Pulmonary                   Neuro/Psych              Cardiovascular              Hyperlipidemia    Exercise tolerance: >4 METS     GI/Hepatic/Renal           Liver disease     Endo/Other      Hypothyroidism: well controlled  Arthritis and cancer (Stomach CA in the past)     Other Findings              Physical Exam    Airway  Mallampati: II  TM Distance: > 6 cm  Neck ROM: normal range of motion   Mouth opening: Normal     Cardiovascular  Regular rate and rhythm,  S1 and S2 normal,  no murmur, click, rub, or gallop             Dental  No notable dental hx       Pulmonary  Breath sounds clear to auscultation               Abdominal         Other Findings            Anesthetic Plan    ASA: 2  Anesthesia type: total IV anesthesia            Anesthetic plan and risks discussed with: Patient

## 2018-01-15 ENCOUNTER — PATIENT OUTREACH (OUTPATIENT)
Dept: OTHER | Age: 53
End: 2018-01-15

## 2018-01-15 NOTE — PROGRESS NOTES
Patient identified as eligible for [address] [place] [address] services. Third telephone outreach attempted. Verified  and zip code with patient as identifiers. Patient states she is about to go into the room with a patient but has a moment, agreed to allow me to make a FU to her home and leave my contact information for CM services. Willing to call me back at another time. Await her return call.

## 2018-01-25 ENCOUNTER — PATIENT OUTREACH (OUTPATIENT)
Dept: OTHER | Age: 53
End: 2018-01-25

## 2018-01-25 NOTE — PROGRESS NOTES
Patient identified as eligible for [address] [place] [address] services and agreed to engage on first call. But has not returned my call. Second telephone outreach attempted. Left discreet voicemail with this CM confidential contact information. Will send UTR letter.

## 2018-01-25 NOTE — LETTER
Ms. Joel Wong [address] [facility] [address] Dear Ms. La Zepeda, My name is Domo Ma RN, Employee Care Manager for [facility] and I have been trying to reach you. The Employee Care  is a free-of-charge confidential service provided to our employees and their family members covered by the [facility]. The program will provide an employee and his/her family with the [facility] expertise to assist in navigating the health care delivery system, provider services, and their overall care needsso as to assure and improve health care interactions and enhance the quality of life. This program is designed to provide you with the opportunity to have a [facility] care manager partner with you for the following services: 
 
1.)  Care transitions-such as when you come home from the hospital 
2.) When help is needed to manage your disease process 3.) When you are faced with managing a chronic or complex medical condition Employee Care Management now partners with [employer]. If you are a qualifying employee, you may receive an additional 10 wellness incentive points for every month of active participation with an Employee Care Manager. [facility] is dedicated to empowering the good health of its community and improving the quality of care and care experiences for employees and their families. We are commited to safeguarding patient confidentiality and privacy,  assuring that every employee has the respect he or she deserves in managing their health. The information shared with your care manager will not be shared with anyone else aside from those you identify as part of your care team, and will only be used to assist you with any identified care needs. Please contact me if you would like this service provided to you. Sincerely, Domo Ma RN, BSN  Employee Care Manager [address] [facility].  
 Meaghan Proyr [phone]  [place] [phone]  [email or web address]

## 2018-02-26 ENCOUNTER — PATIENT OUTREACH (OUTPATIENT)
Dept: OTHER | Age: 53
End: 2018-02-26

## 2018-02-26 NOTE — LETTER
Ms. Breana Johnson [address] [facility] [address] Dear Ms. Heather Cano, My name is Ember Palacio RN, Employee Care Manager for [employer], and I have been trying to reach you. The Employee Care Management [facility]) program is a free-of-charge, confidential service provided to our employees and their family members covered by the [facility]. I can help you with care transitions such as when you come home from the hospital, when help is needed to manage your disease, or when you need assistance coordinating services or appointments. [employer] now partners with [facility]. If you are a qualifying employee, you may receive an additional 10 wellness incentive points for every month of active participation with an Employee Care Manager. As healthcare providers, we know that patients do better when they have close follow up with a primary care provider (PCP). I can help you find one that is convenient to you and covered by your insurance. I can also help you understand any after visit instructions, such as what symptoms to watch out for, or any new or changed medications. We can work together using your preferred communication -- telephone, email, [employer]ish[employer]. If you do not have a [employer] account, I can help you request access. Our program is designed to provide you with the opportunity to have a [employer] care manager partner with you for your healthcare needs. Due to not being able to reach you, I am closing out the current program, but will remain available to you should you have any questions. Please contact me at the below number if I can provide you with assistance for any of the above services. Sincerely, Ember Palacio RN, BSN  Employee Care Manager [email or web address]

## 2018-02-26 NOTE — PROGRESS NOTES
Resolving current episode for case management due to patient unable to reach. Initially spoke with patient who agreed t a return FU call, but now lost to follow up. Patient has not  responded after repeated calls and letters. Final letter sent to patient notifying completion of services due to unable to reach. This writer's contact information and information regarding program services included in materials sent.

## 2018-03-05 ENCOUNTER — HOSPITAL ENCOUNTER (EMERGENCY)
Age: 53
Discharge: HOME | End: 2018-03-05
Attending: EMERGENCY MEDICINE
Payer: COMMERCIAL

## 2018-03-05 ENCOUNTER — APPOINTMENT (OUTPATIENT)
Dept: GENERAL RADIOLOGY | Age: 53
End: 2018-03-05
Attending: EMERGENCY MEDICINE
Payer: COMMERCIAL

## 2018-03-05 VITALS
HEIGHT: 64 IN | TEMPERATURE: 97.9 F | HEART RATE: 56 BPM | BODY MASS INDEX: 31.58 KG/M2 | OXYGEN SATURATION: 98 % | DIASTOLIC BLOOD PRESSURE: 64 MMHG | SYSTOLIC BLOOD PRESSURE: 119 MMHG | RESPIRATION RATE: 18 BRPM | WEIGHT: 185 LBS

## 2018-03-05 DIAGNOSIS — R07.9 CHEST PAIN, UNSPECIFIED TYPE: Primary | ICD-10-CM

## 2018-03-05 LAB
ALBUMIN SERPL-MCNC: 3.9 G/DL (ref 3.5–5)
ALBUMIN/GLOB SERPL: 1.1 {RATIO} (ref 1.2–3.5)
ALP SERPL-CCNC: 65 U/L (ref 50–136)
ALT SERPL-CCNC: 61 U/L (ref 12–65)
ANION GAP SERPL CALC-SCNC: 11 MMOL/L (ref 7–16)
AST SERPL-CCNC: 51 U/L (ref 15–37)
ATRIAL RATE: 53 BPM
ATRIAL RATE: 56 BPM
BASOPHILS # BLD: 0 K/UL (ref 0–0.2)
BASOPHILS NFR BLD: 1 % (ref 0–2)
BILIRUB SERPL-MCNC: 0.3 MG/DL (ref 0.2–1.1)
BUN SERPL-MCNC: 22 MG/DL (ref 6–23)
CALCIUM SERPL-MCNC: 9.6 MG/DL (ref 8.3–10.4)
CALCULATED P AXIS, ECG09: 35 DEGREES
CALCULATED P AXIS, ECG09: 35 DEGREES
CALCULATED R AXIS, ECG10: -12 DEGREES
CALCULATED R AXIS, ECG10: -13 DEGREES
CALCULATED T AXIS, ECG11: -2 DEGREES
CALCULATED T AXIS, ECG11: -22 DEGREES
CHLORIDE SERPL-SCNC: 105 MMOL/L (ref 98–107)
CO2 SERPL-SCNC: 27 MMOL/L (ref 21–32)
CREAT SERPL-MCNC: 0.77 MG/DL (ref 0.6–1)
DIAGNOSIS, 93000: NORMAL
DIAGNOSIS, 93000: NORMAL
DIFFERENTIAL METHOD BLD: ABNORMAL
EOSINOPHIL # BLD: 0.2 K/UL (ref 0–0.8)
EOSINOPHIL NFR BLD: 4 % (ref 0.5–7.8)
ERYTHROCYTE [DISTWIDTH] IN BLOOD BY AUTOMATED COUNT: 13.8 % (ref 11.9–14.6)
GLOBULIN SER CALC-MCNC: 3.7 G/DL (ref 2.3–3.5)
GLUCOSE SERPL-MCNC: 93 MG/DL (ref 65–100)
HCT VFR BLD AUTO: 38.2 % (ref 35.8–46.3)
HGB BLD-MCNC: 12.1 G/DL (ref 11.7–15.4)
IMM GRANULOCYTES # BLD: 0 K/UL (ref 0–0.5)
IMM GRANULOCYTES NFR BLD AUTO: 0 % (ref 0–5)
LYMPHOCYTES # BLD: 3.1 K/UL (ref 0.5–4.6)
LYMPHOCYTES NFR BLD: 50 % (ref 13–44)
MCH RBC QN AUTO: 27.5 PG (ref 26.1–32.9)
MCHC RBC AUTO-ENTMCNC: 31.7 G/DL (ref 31.4–35)
MCV RBC AUTO: 86.8 FL (ref 79.6–97.8)
MONOCYTES # BLD: 0.5 K/UL (ref 0.1–1.3)
MONOCYTES NFR BLD: 7 % (ref 4–12)
NEUTS SEG # BLD: 2.4 K/UL (ref 1.7–8.2)
NEUTS SEG NFR BLD: 38 % (ref 43–78)
P-R INTERVAL, ECG05: 158 MS
P-R INTERVAL, ECG05: 166 MS
PLATELET # BLD AUTO: 223 K/UL (ref 150–450)
PMV BLD AUTO: 10.6 FL (ref 10.8–14.1)
POTASSIUM SERPL-SCNC: 3.9 MMOL/L (ref 3.5–5.1)
PROT SERPL-MCNC: 7.6 G/DL (ref 6.3–8.2)
Q-T INTERVAL, ECG07: 406 MS
Q-T INTERVAL, ECG07: 424 MS
QRS DURATION, ECG06: 94 MS
QRS DURATION, ECG06: 94 MS
QTC CALCULATION (BEZET), ECG08: 391 MS
QTC CALCULATION (BEZET), ECG08: 397 MS
RBC # BLD AUTO: 4.4 M/UL (ref 4.05–5.25)
SODIUM SERPL-SCNC: 143 MMOL/L (ref 136–145)
TROPONIN I BLD-MCNC: 0.02 NG/ML (ref 0.02–0.05)
TROPONIN I SERPL-MCNC: <0.04 NG/ML (ref 0.02–0.05)
VENTRICULAR RATE, ECG03: 53 BPM
VENTRICULAR RATE, ECG03: 56 BPM
WBC # BLD AUTO: 6.3 K/UL (ref 4.3–11.1)

## 2018-03-05 PROCEDURE — 93005 ELECTROCARDIOGRAM TRACING: CPT | Performed by: EMERGENCY MEDICINE

## 2018-03-05 PROCEDURE — 84484 ASSAY OF TROPONIN QUANT: CPT | Performed by: EMERGENCY MEDICINE

## 2018-03-05 PROCEDURE — 99285 EMERGENCY DEPT VISIT HI MDM: CPT | Performed by: EMERGENCY MEDICINE

## 2018-03-05 PROCEDURE — 80053 COMPREHEN METABOLIC PANEL: CPT | Performed by: EMERGENCY MEDICINE

## 2018-03-05 PROCEDURE — 71046 X-RAY EXAM CHEST 2 VIEWS: CPT

## 2018-03-05 PROCEDURE — 85025 COMPLETE CBC W/AUTO DIFF WBC: CPT | Performed by: EMERGENCY MEDICINE

## 2018-03-05 NOTE — ED PROVIDER NOTES
HPI Comments: Patient is a 53-year-old female is coming in with chest pain. She states it's been off and on for several weeks. It is not associated with any particular activity. She states again lasted about 1530 minutes substernal in nature. No real alleviating factors it just seems to go and it's own. Currently she is not having any of the pain she did have an episode earlier today that appeared. She is a nonsmoker no history of hypertension and denies diabetes. She does have family history of coronary artery disease. She's had a normal calcium score in the past.  She states she's never had any other workup. She is scheduled to see cardiology in 3 weeks, but wanted to be checked out prior to a trip in a week and half. Patient is a 45 y.o. female presenting with chest pain. The history is provided by the patient. Chest Pain (Angina)    Pertinent negatives include no abdominal pain, no fever, no nausea and no vomiting. 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 removed    Cervical dysplasia     Fatty liver     H/O wheezing     usually after a respiratory infection, uses prn inhaler    Heart palpitations     occassionally with no treatment     Hx gestational diabetes     no symptoms after childbirth    Hypercholesterolemia     managed with medication     Hypothyroidism     managed with medication     IBS (irritable bowel syndrome)     managed with diet and prn medication     Osteoarthritis     fingers     PUD (peptic ulcer disease) last 1987    Gastric ulcers/duodenal carcinoid, 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    Splenic artery aneurysm ([facility] [address] 06/30/2015    5/21/15-  1 cm, Vascular consulted, no treatment at this time.        Past Surgical History:   Procedure Laterality Date    HX BREAST REDUCTION  5/7/2014    BILATERAL BREAST REDUCTION performed by Eliseo Pace MD at [address] [place] [address]  SECTION  83, 80    x 2    HX COLONOSCOPY      HX ENDOSCOPY  2015    HX HEENT      jaw surgery x2    HX HYSTERECTOMY  2008    complete with bso    HX LAP CHOLECYSTECTOMY      HX ORTHOPAEDIC Right     middle finger surgery joint    HX SHOULDER ARTHROSCOPY Right 2013         HX TONSIL AND ADENOIDECTOMY           Family History:   Problem Relation Age of Onset    Heart Disease Mother     Arthritis-osteo Mother     COPD Mother     Stroke Mother     Diabetes Father     Heart Disease Father     Hypertension Father     No Known Problems Brother        Social History     Social History    Marital status:      Spouse name: N/A    Number of children: N/A    Years of education: N/A     Occupational History    Not on file. Social History Main Topics    Smoking status: Former Smoker     Packs/day: 0.50     Years: 5.00     Quit date: 8/10/1980    Smokeless tobacco: Never Used      Comment: quit 25 years ago / in her 19s    Alcohol use No    Drug use: No    Sexual activity: Yes     Partners: Male     Other Topics Concern    Caffeine Concern No     [place] occ    Exercise Yes     Walks    Seat Belt Yes    Self-Exams Yes     Social History Narrative    Denies physical or sexual abuse         ALLERGIES: Erythromycin and Penicillins    Review of Systems   Constitutional: Negative for fever. Respiratory: Positive for chest tightness. Negative for wheezing and stridor. Cardiovascular: Positive for chest pain. Gastrointestinal: Negative for abdominal pain, diarrhea, nausea and vomiting. Skin: Negative. All other systems reviewed and are negative. Vitals:    18 1755 18 1833   BP: 148/76 135/70   Pulse: 69    Resp: 18    Temp: 97.9 °F (36.6 °C)    SpO2: 98% 100%   Weight: 83.9 kg (185 lb)    Height: 5' 4\" (1.626 m)             Physical Exam   Constitutional: She is oriented to person, place, and time.  She appears well-developed and well-nourished. No distress. HENT:   Head: Normocephalic and atraumatic. Eyes: Conjunctivae and EOM are normal. Pupils are equal, round, and reactive to light. No scleral icterus. Neck: Normal range of motion. Neck supple. No tracheal deviation present. No thyromegaly present. Cardiovascular: Normal rate, regular rhythm and normal heart sounds. Pulmonary/Chest: Effort normal and breath sounds normal. No stridor. No respiratory distress. She has no wheezes. She has no rales. Abdominal: Soft. Bowel sounds are normal. She exhibits no distension. There is no tenderness. There is no rebound and no guarding. Neurological: She is alert and oriented to person, place, and time. No cranial nerve deficit. Coordination normal.   No focal weakness   Skin: Skin is warm and dry. No rash noted. She is not diaphoretic. No erythema. Psychiatric: She has a normal mood and affect. Her behavior is normal.   Nursing note and vitals reviewed. MDM  Number of Diagnoses or Management Options  Chest pain, unspecified type:   Diagnosis management comments: Patient's second troponin was negative. She's had off and on pain for weeks. She prefers outpatient plan to being transferred for admission. I did call [facility] cardiology to schedule patient in the office this week for further evaluation. Karthikeyan Lund MD; 3/6/2018 @12:11 AM Voice dictation software was used during the making of this note. This software is not perfect and grammatical and other typographical errors may be present.   This note has not been proofread for errors.  ===================================================================        Amount and/or Complexity of Data Reviewed  Clinical lab tests: ordered and reviewed (Results for orders placed or performed during the hospital encounter of 03/05/18  -TROPONIN I       Result                                            Value                         Ref Range                       Troponin-I, Qt. <0.04                         0.02 - 0.05 NG/ML          -CBC WITH AUTOMATED DIFF       Result                                            Value                         Ref Range                       WBC                                               6.3                           4.3 - 11.1 K/uL                 RBC                                               4.40                          4.05 - 5.25 M/uL                HGB                                               12.1                          11.7 - 15.4 g/dL                HCT                                               38.2                          35.8 - 46.3 %                   MCV                                               86.8                          79.6 - 97.8 FL                  MCH                                               27.5                          26.1 - 32.9 PG                  MCHC                                              31.7                          31.4 - 35.0 g/dL                RDW                                               13.8                          11.9 - 14.6 %                   PLATELET                                          223                           150 - 450 K/uL                  MPV                                               10.6 (L)                      10.8 - 14.1 FL                  DF                                                AUTOMATED                                                     NEUTROPHILS                                       38 (L)                        43 - 78 %                       LYMPHOCYTES                                       50 (H)                        13 - 44 %                       MONOCYTES                                         7                             4.0 - 12.0 %                    EOSINOPHILS                                       4                             0.5 - 7.8 %                     BASOPHILS 1                             0.0 - 2.0 %                     IMMATURE GRANULOCYTES                             0                             0.0 - 5.0 %                     ABS. NEUTROPHILS                                  2.4                           1.7 - 8.2 K/UL                  ABS. LYMPHOCYTES                                  3.1                           0.5 - 4.6 K/UL                  ABS. MONOCYTES                                    0.5                           0.1 - 1.3 K/UL                  ABS. EOSINOPHILS                                  0.2                           0.0 - 0.8 K/UL                  ABS. BASOPHILS                                    0.0                           0.0 - 0.2 K/UL                  ABS. IMM.  GRANS.                                  0.0                           0.0 - 0.5 K/UL             -METABOLIC PANEL, COMPREHENSIVE       Result                                            Value                         Ref Range                       Sodium                                            143                           136 - 145 mmol/L                Potassium                                         3.9                           3.5 - 5.1 mmol/L                Chloride                                          105                           98 - 107 mmol/L                 CO2                                               27                            21 - 32 mmol/L                  Anion gap                                         11                            7 - 16 mmol/L                   Glucose                                           93                            65 - 100 mg/dL                  BUN                                               22                            6 - 23 MG/DL                    Creatinine                                        0.77                          0.6 - 1.0 MG/DL                 GFR est AA                                        >60 >60 ml/min/1.73m2               GFR est non-AA                                    >60                           >60 ml/min/1.73m2               Calcium                                           9.6                           8.3 - 10.4 MG/DL                Bilirubin, total                                  0.3                           0.2 - 1.1 MG/DL                 ALT (SGPT)                                        61                            12 - 65 U/L                     AST (SGOT)                                        51 (H)                        15 - 37 U/L                     Alk. phosphatase                                  65                            50 - 136 U/L                    Protein, total                                    7.6                           6.3 - 8.2 g/dL                  Albumin                                           3.9                           3.5 - 5.0 g/dL                  Globulin                                          3.7 (H)                       2.3 - 3.5 g/dL                  A-G Ratio                                         1.1 (L)                       1.2 - 3.5                 )  Tests in the radiology section of CPT®: ordered and reviewed (Xr Chest Pa Lat    Result Date: 3/5/2018  PA AND LATERAL CHEST X-RAY. Clinical Indication: Chest pain for two weeks Comparison: Chest x-ray dated 4/20/2014 Findings: 2 views of the chest submitted demonstrate the cardiac silhouette and mediastinum to be unremarkable. There is no pleural effusion or pneumothorax. The lung parenchyma is clear. The included osseous structures are unremarkable.      Impression: Normal chest x-ray.     )  Independent visualization of images, tracings, or specimens: yes (Normal sinus rhythm, narrow QRS complex, no bundle branch blocks, and no ST segment elevation )          ED Course       Procedures

## 2018-03-05 NOTE — ED TRIAGE NOTES
Pt reports she is going on vacation in a few weeks and recently started having chest pains again but can't get in to see her cardiologist ([address] [facility]) before she goes; she decided to come in to the ED today to be checked out as she had more pain again this afternoon, pt is currently pain free.

## 2018-03-06 NOTE — DISCHARGE INSTRUCTIONS
Chest Pain: Care Instructions  Your Care Instructions    There are many things that can cause chest pain. Some are not serious and will get better on their own in a few days. But some kinds of chest pain need more testing and treatment. Your doctor may have recommended a follow-up visit in the next 8 to 12 hours. If you are not getting better, you may need more tests or treatment. Even though your doctor has released you, you still need to watch for any problems. The doctor carefully checked you, but sometimes problems can develop later. If you have new symptoms or if your symptoms do not get better, get medical care right away. If you have worse or different chest pain or pressure that lasts more than 5 minutes or you passed out (lost consciousness), call 911 or seek other emergency help right away. A medical visit is only one step in your treatment. Even if you feel better, you still need to do what your doctor recommends, such as going to all suggested follow-up appointments and taking medicines exactly as directed. This will help you recover and help prevent future problems. How can you care for yourself at home? · Rest until you feel better. · Take your medicine exactly as prescribed. Call your doctor if you think you are having a problem with your medicine. · Do not drive after taking a prescription pain medicine. When should you call for help? Call 911 if:  ? · You passed out (lost consciousness). ? · You have severe difficulty breathing. ? · You have symptoms of a heart attack. These may include:  ¨ Chest pain or pressure, or a strange feeling in your chest.  ¨ Sweating. ¨ Shortness of breath. ¨ Nausea or vomiting. ¨ Pain, pressure, or a strange feeling in your back, neck, jaw, or upper belly or in one or both shoulders or arms. ¨ Lightheadedness or sudden weakness. ¨ A fast or irregular heartbeat.   After you call 911, the  may tell you to chew 1 adult-strength or 2 to 4 low-dose aspirin. Wait for an ambulance. Do not try to drive yourself. ?Call your doctor today if:  ? · You have any trouble breathing. ? · Your chest pain gets worse. ? · You are dizzy or lightheaded, or you feel like you may faint. ? · You are not getting better as expected. ? · You are having new or different chest pain. Where can you learn more? Go to http://lynn-joanna.info/. Enter A120 in the search box to learn more about \"Chest Pain: Care Instructions. \"  Current as of: March 20, 2017  Content Version: 11.4  © 1239-0342 [email or web address]. Care instructions adapted under license by [employer] (which disclaims liability or warranty for this information). If you have questions about a medical condition or this instruction, always ask your healthcare professional. [address] any warranty or liability for your use of this information.

## 2018-03-06 NOTE — ED NOTES
I have reviewed discharge instructions with the patient. The patient verbalized understanding. Patient left ED via Discharge Method: ambulatory to Home with (self). Opportunity for questions and clarification provided. Patient given 0 scripts. To continue your aftercare when you leave the hospital, you may receive an automated call from our care team to check in on how you are doing. This is a free service and part of our promise to provide the best care and service to meet your aftercare needs.  If you have questions, or wish to unsubscribe from this service please call [phone]. Thank you for Choosing our [facility] Emergency Department.

## 2018-03-30 NOTE — IP AVS SNAPSHOT
Linda Castellanos 
 
 
 [address] 
[phone] Patient: Freddy Rojas MRN: [id number]:[id number] You are allergic to the following Allergen Reactions Erythromycin Nausea and Vomiting Penicillins Hives Recent Documentation Weight BMI OB Status Smoking Status 82.7 kg 31.3 kg/m2 Hysterectomy Former Smoker Emergency Contacts Name Discharge Info Relation Home Work Mobile Kp Wilson DISCHARGE CAREGIVER [3] Spouse [3] [phone] Chanelle Matamoros (Sister-In-Law) DISCHARGE CAREGIVER [3] Other Relative [6]   [phone] About your hospitalization You were admitted on:  April 21, 2017 You last received care in the:  [facility] OP PACU You were discharged on:  April 21, 2017 Unit phone number:  [phone] Why you were hospitalized Your primary diagnosis was:  Not on File Providers Seen During Your Hospitalizations Provider Role Specialty Primary office phone Trisha Tubbs DO Attending Provider Ophthalmology [phone] Your Primary Care Physician (PCP) Primary Care Physician Office Phone Office Fax [facility], [facility] 109 Follow-up Information None Your Appointments Tuesday May 30, 2017  8:30 AM EDT ANNUAL with Sanjuana Balbuena MD  
[facility] ([address] [place] [address]) [address] [place] [id number] [phone] Current Discharge Medication List  
  
ASK your doctor about these medications Dose & Instructions Dispensing Information Comments Morning Noon Evening Bedtime COLESTID 1 gram tablet Generic drug:  colestipol Your last dose was: Your next dose is:    
   
   
 Dose:  1 g Take 1 g by mouth every evening. Refills:  0 LOTRONEX 0.5 mg Tab Patient presents for day one Darzalex. Reviewed plan of care, patient verbalizes understanding. IV started, flushes well with brisk blood return. Results called to Dr. Coreas and dalton to proceed with treatment obtained. Infusion titrated up per guidelines and patient tolerance. Report to Ladan PIZANO.   Generic drug:  alosetron Your last dose was: Your next dose is: Take  by mouth as needed. Indications: DIARRHEA PREDOMINANT IRRITABLE BOWEL SYNDROME Refills:  0  
     
   
   
   
  
 SYNTHROID 88 mcg tablet Generic drug:  levothyroxine Your last dose was: Your next dose is:    
   
   
 Dose:  88 mcg Take 88 mcg by mouth Daily (before breakfast). Refills:  0  
     
   
   
   
  
 TRILIPIX 45 mg capsule Generic drug:  fenofibric acid Your last dose was: Your next dose is:    
   
   
 Dose:  45 mg Take 45 mg by mouth every morning. Indications: hypercholesterolemia Refills:  0 Discharge Instructions Ion Evans D.O. Ophthalmic Plastic Surgery [address] [place] [address] 
[phone] INSTRUCTIONS AFTER YOUR SURGERY 1. For the first 24 hours after surgery: A. Use ice packs for 15 - 20 minutes every hour until bedtime, then as needed. B.  Do not lift heavy objects or place a strain on your body. C.  Do not engage in important business, drink alcoholic beverages, or drive. Taylor Lopessen your head elevated for the first evening or two. 
 E.  Have a friend or relative with you. F.  Do not rub your eyes 2. Call Dr. Tori Mathias if any of the following occur: A. Sudden loss of vision. Cover one eye at a time to check vision. B.  Excessive bleeding or blood-soaked bandage. NOTE:  Small amounts of bleeding or blood-tinged tears are normal. 
 C.  Redness, warmth, or hardness around the wound. D.  Fever greater than 100°F. 
E.  Pain not relieved by Tylenol, Ibuprofen, or pain relievers prescribed by Doctor Chen. 3.  During the next few days after surgery: A. Use ice packs or cold moist compresses several times a day. B.  Resume your normal activities VERY SLOWLY, no heavy exercise. C.  Resume your normal diet; avoid overly spicy or greasy foods for a few days. D.  Resume taking your normal prescription medications and/or eyedrops. Nneka Carrier the day after surgery, clean shower water will not hurt the wounds. Cristina Part may wear makeup after 7  10 days on the incisions, not before. G.  Remove the bandage or dressing in    hours. H.  Use the following medications carefully and as directed: 
       Use eye ointment on stitches twice a day. NOTE:  eye ointment may cause blurry vision. .   
 I. Special instructions: 
                            . 
 
4. Your postop visit with Dr. Karen Trevino is scheduled on 04/27/17  at 2:45 pm at his Office. Call our office if you have any questions, concerns, or problems after your surgery . [phone] FAX Discharge Orders None Introducing [facility] SERVICES! Dear Rogelio Pretty: Thank you for requesting a [employer] account. Our records indicate that you already have an active [employer] account. You can access your account anytime at [email or web address] Did you know that you can access your hospital and ER discharge instructions at any time in [employer]? You can also review all of your test results from your hospital stay or ER visit. Additional Information If you have questions, please visit the Frequently Asked Questions section of the [employer] website at [email or web address] Remember, [employer] is NOT to be used for urgent needs. For medical emergencies, dial 911. Now available from your iPhone and Android! General Information Please provide this summary of care documentation to your next provider. Patient Signature:  ____________________________________________________________ Date:  ____________________________________________________________  
  
Angela Al Provider Signature:  ____________________________________________________________ Date:  ____________________________________________________________

## 2018-05-22 ENCOUNTER — HOSPITAL ENCOUNTER (OUTPATIENT)
Dept: MAMMOGRAPHY | Age: 53
Discharge: HOME | End: 2018-05-22
Attending: INTERNAL MEDICINE
Payer: COMMERCIAL

## 2018-05-22 DIAGNOSIS — Z12.31 VISIT FOR SCREENING MAMMOGRAM: ICD-10-CM

## 2018-05-22 PROCEDURE — 77063 BREAST TOMOSYNTHESIS BI: CPT

## 2019-06-03 RX ORDER — SODIUM CHLORIDE 0.9 % (FLUSH) 0.9 %
5-40 SYRINGE (ML) INJECTION EVERY 8 HOURS
Status: CANCELLED | OUTPATIENT
Start: 2019-06-03

## 2019-06-03 RX ORDER — SODIUM CHLORIDE 0.9 % (FLUSH) 0.9 %
5-40 SYRINGE (ML) INJECTION AS NEEDED
Status: CANCELLED | OUTPATIENT
Start: 2019-06-03

## 2019-06-20 ENCOUNTER — ANESTHESIA EVENT (OUTPATIENT)
Dept: SURGERY | Age: 54
End: 2019-06-20
Payer: COMMERCIAL

## 2019-06-20 ENCOUNTER — ANESTHESIA (OUTPATIENT)
Dept: SURGERY | Age: 54
End: 2019-06-20
Payer: COMMERCIAL

## 2019-06-20 ENCOUNTER — HOSPITAL ENCOUNTER (OUTPATIENT)
Age: 54
Setting detail: OUTPATIENT SURGERY
Discharge: HOME | End: 2019-06-20
Attending: ORTHOPAEDIC SURGERY | Admitting: ORTHOPAEDIC SURGERY
Payer: COMMERCIAL

## 2019-06-20 VITALS
DIASTOLIC BLOOD PRESSURE: 62 MMHG | RESPIRATION RATE: 16 BRPM | TEMPERATURE: 98.5 F | WEIGHT: 178 LBS | SYSTOLIC BLOOD PRESSURE: 149 MMHG | OXYGEN SATURATION: 97 % | BODY MASS INDEX: 30.55 KG/M2 | HEART RATE: 45 BPM

## 2019-06-20 PROCEDURE — 74011000250 HC RX REV CODE- 250: Performed by: ORTHOPAEDIC SURGERY

## 2019-06-20 PROCEDURE — 77030039266 HC ADH SKN EXOFIN S2SG -A: Performed by: ORTHOPAEDIC SURGERY

## 2019-06-20 PROCEDURE — 74011250636 HC RX REV CODE- 250/636: Performed by: ANESTHESIOLOGY

## 2019-06-20 PROCEDURE — 77030000032 HC CUF TRNQT ZIMM -B: Performed by: ORTHOPAEDIC SURGERY

## 2019-06-20 PROCEDURE — 74011250636 HC RX REV CODE- 250/636: Performed by: ORTHOPAEDIC SURGERY

## 2019-06-20 PROCEDURE — 77030018836 HC SOL IRR NACL ICUM -A: Performed by: ORTHOPAEDIC SURGERY

## 2019-06-20 PROCEDURE — 74011250637 HC RX REV CODE- 250/637: Performed by: ANESTHESIOLOGY

## 2019-06-20 PROCEDURE — 76210000020 HC REC RM PH II FIRST 0.5 HR: Performed by: ORTHOPAEDIC SURGERY

## 2019-06-20 PROCEDURE — 74011250636 HC RX REV CODE- 250/636

## 2019-06-20 PROCEDURE — 88304 TISSUE EXAM BY PATHOLOGIST: CPT

## 2019-06-20 PROCEDURE — 76010000154 HC OR TIME FIRST 0.5 HR: Performed by: ORTHOPAEDIC SURGERY

## 2019-06-20 PROCEDURE — 76210000006 HC OR PH I REC 0.5 TO 1 HR: Performed by: ORTHOPAEDIC SURGERY

## 2019-06-20 PROCEDURE — 77030002986 HC SUT PROL J&J -A: Performed by: ORTHOPAEDIC SURGERY

## 2019-06-20 PROCEDURE — 76060000032 HC ANESTHESIA 0.5 TO 1 HR: Performed by: ORTHOPAEDIC SURGERY

## 2019-06-20 RX ORDER — LIDOCAINE HYDROCHLORIDE 20 MG/ML
INJECTION, SOLUTION EPIDURAL; INFILTRATION; INTRACAUDAL; PERINEURAL AS NEEDED
Status: DISCONTINUED | OUTPATIENT
Start: 2019-06-20 | End: 2019-06-20 | Stop reason: HOSPADM

## 2019-06-20 RX ORDER — SODIUM CHLORIDE 0.9 % (FLUSH) 0.9 %
5-40 SYRINGE (ML) INJECTION AS NEEDED
Status: DISCONTINUED | OUTPATIENT
Start: 2019-06-20 | End: 2019-06-20 | Stop reason: HOSPADM

## 2019-06-20 RX ORDER — MIDAZOLAM HYDROCHLORIDE 1 MG/ML
2 INJECTION, SOLUTION INTRAMUSCULAR; INTRAVENOUS
Status: DISCONTINUED | OUTPATIENT
Start: 2019-06-20 | End: 2019-06-20 | Stop reason: HOSPADM

## 2019-06-20 RX ORDER — CEFAZOLIN SODIUM/WATER 2 G/20 ML
2 SYRINGE (ML) INTRAVENOUS
Status: COMPLETED | OUTPATIENT
Start: 2019-06-20 | End: 2019-06-20

## 2019-06-20 RX ORDER — HYDROMORPHONE HYDROCHLORIDE 2 MG/ML
0.5 INJECTION, SOLUTION INTRAMUSCULAR; INTRAVENOUS; SUBCUTANEOUS
Status: DISCONTINUED | OUTPATIENT
Start: 2019-06-20 | End: 2019-06-20 | Stop reason: HOSPADM

## 2019-06-20 RX ORDER — SODIUM CHLORIDE 0.9 % (FLUSH) 0.9 %
5-40 SYRINGE (ML) INJECTION EVERY 8 HOURS
Status: DISCONTINUED | OUTPATIENT
Start: 2019-06-20 | End: 2019-06-20 | Stop reason: HOSPADM

## 2019-06-20 RX ORDER — IBUPROFEN 200 MG
400 TABLET ORAL
COMMUNITY

## 2019-06-20 RX ORDER — SODIUM CHLORIDE, SODIUM LACTATE, POTASSIUM CHLORIDE, CALCIUM CHLORIDE 600; 310; 30; 20 MG/100ML; MG/100ML; MG/100ML; MG/100ML
1000 INJECTION, SOLUTION INTRAVENOUS CONTINUOUS
Status: DISCONTINUED | OUTPATIENT
Start: 2019-06-20 | End: 2019-06-20 | Stop reason: HOSPADM

## 2019-06-20 RX ORDER — ALBUTEROL SULFATE 0.83 MG/ML
2.5 SOLUTION RESPIRATORY (INHALATION) AS NEEDED
Status: DISCONTINUED | OUTPATIENT
Start: 2019-06-20 | End: 2019-06-20 | Stop reason: HOSPADM

## 2019-06-20 RX ORDER — BUPIVACAINE HYDROCHLORIDE 5 MG/ML
INJECTION, SOLUTION EPIDURAL; INTRACAUDAL AS NEEDED
Status: DISCONTINUED | OUTPATIENT
Start: 2019-06-20 | End: 2019-06-20 | Stop reason: HOSPADM

## 2019-06-20 RX ORDER — PROPOFOL 10 MG/ML
INJECTION, EMULSION INTRAVENOUS
Status: DISCONTINUED | OUTPATIENT
Start: 2019-06-20 | End: 2019-06-20 | Stop reason: HOSPADM

## 2019-06-20 RX ORDER — PROPOFOL 10 MG/ML
INJECTION, EMULSION INTRAVENOUS AS NEEDED
Status: DISCONTINUED | OUTPATIENT
Start: 2019-06-20 | End: 2019-06-20 | Stop reason: HOSPADM

## 2019-06-20 RX ORDER — ACETAMINOPHEN 500 MG
1000 TABLET ORAL ONCE
Status: COMPLETED | OUTPATIENT
Start: 2019-06-20 | End: 2019-06-20

## 2019-06-20 RX ORDER — ONDANSETRON 2 MG/ML
4 INJECTION INTRAMUSCULAR; INTRAVENOUS
Status: DISCONTINUED | OUTPATIENT
Start: 2019-06-20 | End: 2019-06-20 | Stop reason: HOSPADM

## 2019-06-20 RX ORDER — LIDOCAINE HYDROCHLORIDE 10 MG/ML
0.1 INJECTION INFILTRATION; PERINEURAL AS NEEDED
Status: DISCONTINUED | OUTPATIENT
Start: 2019-06-20 | End: 2019-06-20 | Stop reason: HOSPADM

## 2019-06-20 RX ORDER — LIDOCAINE HYDROCHLORIDE 10 MG/ML
INJECTION INFILTRATION; PERINEURAL AS NEEDED
Status: DISCONTINUED | OUTPATIENT
Start: 2019-06-20 | End: 2019-06-20 | Stop reason: HOSPADM

## 2019-06-20 RX ADMIN — PROPOFOL 200 MCG/KG/MIN: 10 INJECTION, EMULSION INTRAVENOUS at 13:46

## 2019-06-20 RX ADMIN — PROPOFOL 50 MG: 10 INJECTION, EMULSION INTRAVENOUS at 13:46

## 2019-06-20 RX ADMIN — SODIUM CHLORIDE, SODIUM LACTATE, POTASSIUM CHLORIDE, AND CALCIUM CHLORIDE: 600; 310; 30; 20 INJECTION, SOLUTION INTRAVENOUS at 13:42

## 2019-06-20 RX ADMIN — Medication 2 G: at 13:47

## 2019-06-20 RX ADMIN — SODIUM CHLORIDE, SODIUM LACTATE, POTASSIUM CHLORIDE, AND CALCIUM CHLORIDE 1000 ML: 600; 310; 30; 20 INJECTION, SOLUTION INTRAVENOUS at 12:18

## 2019-06-20 RX ADMIN — ACETAMINOPHEN 1000 MG: 500 TABLET, FILM COATED ORAL at 12:17

## 2019-06-20 RX ADMIN — LIDOCAINE HYDROCHLORIDE 80 MG: 20 INJECTION, SOLUTION EPIDURAL; INFILTRATION; INTRACAUDAL; PERINEURAL at 13:46

## 2019-06-20 NOTE — DISCHARGE INSTRUCTIONS
.Keep dressing clean, dry and intact until post op day number 2-3. Then may shower, pat dry and keep covered until follow up. Do not scrub incision or submerge under water. Move fingers, elevate, and ice to prevent swelling. No heavy lifting. ACTIVITY  · As tolerated and as directed by your doctor. ·  Move fingers, elevate, and ice to prevent swelling. · No heavy lifting. · Bathe or shower but must keep dressing clean, dry and intact until post op day number 2-3. Then may shower, pat dry and keep covered until follow up. Do not scrub incision or submerge under wateras directed by your doctor. DIET  · Clear liquids until no nausea or vomiting; then light diet for the first day. · Advance to regular diet on second day, unless your doctor orders otherwise. · If nausea and vomiting continues, call your doctor. · Avoid greasy and spicy food today to reduce nausea. PAIN  · Take pain medication as directed by your doctor. · Call your doctor if pain is NOT relieved by medication. · DO NOT take aspirin of blood thinners unless directed by your doctor. · Take pain pills with food to reduce nausea  · Pain pills may cause constipation. May use stool softener    DRESSING CARE Bathe or shower but must keep dressing clean, dry and intact until post op day number 2-3. Then may shower, pat dry and keep covered until follow up. Do not scrub incision or submerge under wateras directed by your doctor. CALL YOUR DOCTOR IF   · Excessive bleeding that does not stop after holding pressure over the area  · Temperature of 101 degrees F or above  · Excessive redness, swelling or bruising, and/ or green or yellow, smelly discharge from incision    AFTER ANESTHESIA   · For the first 24 hours: DO NOT Drive, Drink alcoholic beverages, or Make important decisions. · Be aware of dizziness following anesthesia and while taking pain medication.      APPOINTMENT DATE/ TIME: July 2, 2019 at 9:45 at [address] Pedro Humphries PHONE NUMBER Chandrika Andrews from Nurse    PATIENT INSTRUCTIONS:    After general anesthesia or intravenous sedation, for 24 hours or while taking prescription Narcotics:  · Limit your activities  · Do not drive and operate hazardous machinery  · Do not make important personal or business decisions  · Do  not drink alcoholic beverages  · If you have not urinated within 8 hours after discharge, please contact your surgeon on call. *  Please give a list of your current medications to your Primary Care Provider. *  Please update this list whenever your medications are discontinued, doses are      changed, or new medications (including over-the-counter products) are added. *  Please carry medication information at all times in case of emergency situations. These are general instructions for a healthy lifestyle:    No smoking/ No tobacco products/ Avoid exposure to second hand smoke    Surgeon General's Warning:  Quitting smoking now greatly reduces serious risk to your health. Obesity, smoking, and sedentary lifestyle greatly increases your risk for illness    A healthy diet, regular physical exercise & weight monitoring are important for maintaining a healthy lifestyle    You may be retaining fluid if you have a history of heart failure or if you experience any of the following symptoms:  Weight gain of 3 pounds or more overnight or 5 pounds in a week, increased swelling in our hands or feet or shortness of breath while lying flat in bed. Please call your doctor as soon as you notice any of these symptoms; do not wait until your next office visit. Recognize signs and symptoms of STROKE:    F-face looks uneven    A-arms unable to move or move unevenly    S-speech slurred or non-existent    T-time-call 911 as soon as signs and symptoms begin-DO NOT go       Back to bed or wait to see if you get better-TIME IS BRAIN.

## 2019-06-20 NOTE — ANESTHESIA POSTPROCEDURE EVALUATION
Procedure(s):  RIGHT RING TRIGGER FINGER RELEASE LOCAL W/ MAC.    total IV anesthesia    Anesthesia Post Evaluation      Multimodal analgesia: multimodal analgesia used between 6 hours prior to anesthesia start to PACU discharge  Patient location during evaluation: bedside  Patient participation: complete - patient participated  Level of consciousness: awake and responsive to light touch  Pain management: adequate  Airway patency: patent  Anesthetic complications: no  Cardiovascular status: acceptable, hemodynamically stable, blood pressure returned to baseline and stable  Respiratory status: acceptable, unassisted, spontaneous ventilation and nonlabored ventilation  Hydration status: acceptable  Post anesthesia nausea and vomiting:  controlled      No vitals data found for the desired time range.

## 2019-06-20 NOTE — BRIEF OP NOTE
BRIEF OPERATIVE NOTE    Date of Procedure: 2019   Preoperative Diagnosis: Trigger finger, right ring finger [M65.341]  Postoperative Diagnosis: Trigger finger, right ring finger [M65.341]    Procedure(s):  RIGHT RING TRIGGER FINGER RELEASE   RIGHT RING CYST EXCISION LOCAL W/ MAC  Surgeon(s) and Role:     * Almetta Apgar, MD - Primary         Surgical Assistant: ANDREZ    Surgical Staff:  Circ-1: Eduar De Leon RN  Scrub Tech-1: Justine PATEL  Event Time In Time Out   Incision Start 1353    Incision Close 1404      Anesthesia: MAC   Estimated Blood Loss: MINIMAL  Specimens:   ID Type Source Tests Collected by Time Destination   1 : right ring finger cyst Preservative Finger  Almetta Apgar, MD 2019 1404 Pathology      Findings: SEE DICTATION   Complications: NONE  Implants: * No implants in log *

## 2019-06-20 NOTE — ANESTHESIA PREPROCEDURE EVALUATION
Anesthetic History   No history of anesthetic complications            Review of Systems / Medical History  Patient summary reviewed and pertinent labs reviewed    Pulmonary                   Neuro/Psych              Cardiovascular              Hyperlipidemia    Exercise tolerance: >4 METS     GI/Hepatic/Renal     GERD: well controlled      Liver disease     Endo/Other      Hypothyroidism: well controlled  Arthritis and cancer (Stomach CA in the past)     Other Findings              Physical Exam    Airway  Mallampati: II  TM Distance: > 6 cm  Neck ROM: normal range of motion   Mouth opening: Normal     Cardiovascular  Regular rate and rhythm,  S1 and S2 normal,  no murmur, click, rub, or gallop  Rhythm: regular  Rate: normal      Pertinent negatives: No peripheral edema   Dental  No notable dental hx       Pulmonary  Breath sounds clear to auscultation               Abdominal         Other Findings            Anesthetic Plan    ASA: 2  Anesthesia type: total IV anesthesia          Induction: Intravenous  Anesthetic plan and risks discussed with: Patient

## 2019-06-24 NOTE — OP NOTES
Operative Report       DOS:  6/20/19  Preoperative diagnosis:  Trigger finger, right ring finger [M65.341]    Postoperative diagnosis: Trigger finger, right ring finger [M65.341]    Surgeon(s) and Role:     * Grace Jensen MD - Primary     Anesthesia: MAC Local with MAC. Procedures: Procedure(s):  RIGHT RING TRIGGER FINGER RELEASE LOCAL W/ MAC   RIGHT RING CYST EXCISION    EBL/IV FLUIDS: Per Anesthesia. COMPLICATIONS: None. DISPOSITION: Stable to recovery room. INDICATIONS FOR PROCEDURE: The patient is a pleasant 57-year-old female with history of  right ring trigger that has failed nonoperative measures. After both operative and nonoperative treatment options were discussed, the decision was made to go ahead with a right  ring trigger release. Risks and benefits of the procedure were discussed including, but not limited to bleeding, infection, injury to adjacent structures consisting of tendon, artery, and nerve, need for additional procedures, wound dehiscence, scar formation, incomplete resolution of symptoms, recurrence of symptoms, and transient neuropraxia. Informed consent was obtained. PROCEDURE IN DETAIL: The patient was seen and marked in the preoperative suite. The patient was taken back to the OR, placed on the table in supine position with right upper extremities on hand tables. right  upper extremities were prepped and draped in standard sterile fashion. A formal timeout was performed confirming patient identification, preoperative antibiotics, and planned operative procedure. We infiltrated planned incision site with lidocaine and Marcaine, exsanguinated the right  upper extremity and the tourniquet was placed up to 250 mmHg. A standard incision was made Overlying the A1 pulley Of the right ring. We dissected down bluntly with Ragnell retractors identifying the A1 pulley and the cyst.  We released the A1 pulley both proximal and distal under direct vision in its entirety. The cyst was excised in one piece and sent to path. Full flexion and extension showed no triggering. We irrigated copiously with normal saline. Closed with prolene and glue. The tourniquet was let down, the fingers had brisk capillary refill and a soft sterile dressing was placed. POSTOPERATIVE CARE: Early motion. No heavy lifting.  Followup in 2 weeks for suture removal.  Closure: Primary    Complications: None     Signed By: Nelly Brito MD

## 2019-07-16 ENCOUNTER — HOSPITAL ENCOUNTER (OUTPATIENT)
Dept: MAMMOGRAPHY | Age: 54
Discharge: HOME | End: 2019-07-16
Attending: INTERNAL MEDICINE
Payer: COMMERCIAL

## 2019-07-16 DIAGNOSIS — Z12.39 BREAST SCREENING, UNSPECIFIED: ICD-10-CM

## 2019-07-16 PROCEDURE — 77063 BREAST TOMOSYNTHESIS BI: CPT

## 2019-07-24 ENCOUNTER — HOSPITAL ENCOUNTER (OUTPATIENT)
Dept: MAMMOGRAPHY | Age: 54
Discharge: HOME | End: 2019-07-24
Attending: INTERNAL MEDICINE
Payer: COMMERCIAL

## 2019-07-24 DIAGNOSIS — R92.8 ABNORMAL SCREENING MAMMOGRAM: ICD-10-CM

## 2019-07-24 PROCEDURE — 76642 ULTRASOUND BREAST LIMITED: CPT

## 2020-02-03 ENCOUNTER — HOSPITAL ENCOUNTER (OUTPATIENT)
Dept: MAMMOGRAPHY | Age: 55
Discharge: HOME | End: 2020-02-03
Attending: INTERNAL MEDICINE
Payer: COMMERCIAL

## 2020-02-03 DIAGNOSIS — R92.8 ABNORMAL MAMMOGRAM: ICD-10-CM

## 2020-02-03 PROCEDURE — 77065 DX MAMMO INCL CAD UNI: CPT

## 2020-02-03 PROCEDURE — 76642 ULTRASOUND BREAST LIMITED: CPT

## 2020-02-13 PROCEDURE — 88305 TISSUE EXAM BY PATHOLOGIST: CPT

## 2020-02-13 PROCEDURE — 88312 SPECIAL STAINS GROUP 1: CPT

## 2020-02-14 ENCOUNTER — HOSPITAL ENCOUNTER (OUTPATIENT)
Dept: LAB | Age: 55
Discharge: HOME | End: 2020-02-14

## 2020-02-25 RX ORDER — SODIUM CHLORIDE 0.9 % (FLUSH) 0.9 %
5-40 SYRINGE (ML) INJECTION AS NEEDED
Status: CANCELLED | OUTPATIENT
Start: 2020-02-25

## 2020-02-25 RX ORDER — SODIUM CHLORIDE 0.9 % (FLUSH) 0.9 %
5-40 SYRINGE (ML) INJECTION EVERY 8 HOURS
Status: CANCELLED | OUTPATIENT
Start: 2020-02-25

## 2020-03-11 ENCOUNTER — ANESTHESIA EVENT (OUTPATIENT)
Dept: SURGERY | Age: 55
End: 2020-03-11
Payer: COMMERCIAL

## 2020-03-12 ENCOUNTER — HOSPITAL ENCOUNTER (OUTPATIENT)
Age: 55
Setting detail: OUTPATIENT SURGERY
Discharge: HOME | End: 2020-03-12
Attending: ORTHOPAEDIC SURGERY | Admitting: ORTHOPAEDIC SURGERY
Payer: COMMERCIAL

## 2020-03-12 ENCOUNTER — ANESTHESIA (OUTPATIENT)
Dept: SURGERY | Age: 55
End: 2020-03-12
Payer: COMMERCIAL

## 2020-03-12 VITALS
SYSTOLIC BLOOD PRESSURE: 118 MMHG | TEMPERATURE: 97.8 F | WEIGHT: 180 LBS | RESPIRATION RATE: 16 BRPM | HEART RATE: 59 BPM | BODY MASS INDEX: 30.9 KG/M2 | DIASTOLIC BLOOD PRESSURE: 51 MMHG | OXYGEN SATURATION: 97 %

## 2020-03-12 LAB — GLUCOSE BLD STRIP.AUTO-MCNC: 123 MG/DL (ref 65–100)

## 2020-03-12 PROCEDURE — 77030018836 HC SOL IRR NACL ICUM -A: Performed by: ORTHOPAEDIC SURGERY

## 2020-03-12 PROCEDURE — 76010000154 HC OR TIME FIRST 0.5 HR: Performed by: ORTHOPAEDIC SURGERY

## 2020-03-12 PROCEDURE — 76210000063 HC OR PH I REC FIRST 0.5 HR: Performed by: ORTHOPAEDIC SURGERY

## 2020-03-12 PROCEDURE — 76060000031 HC ANESTHESIA FIRST 0.5 HR: Performed by: ORTHOPAEDIC SURGERY

## 2020-03-12 PROCEDURE — 77030000032 HC CUF TRNQT ZIMM -B: Performed by: ORTHOPAEDIC SURGERY

## 2020-03-12 PROCEDURE — 82962 GLUCOSE BLOOD TEST: CPT

## 2020-03-12 PROCEDURE — 74011250636 HC RX REV CODE- 250/636: Performed by: NURSE ANESTHETIST, CERTIFIED REGISTERED

## 2020-03-12 PROCEDURE — 74011250636 HC RX REV CODE- 250/636: Performed by: ORTHOPAEDIC SURGERY

## 2020-03-12 PROCEDURE — 74011000250 HC RX REV CODE- 250: Performed by: ORTHOPAEDIC SURGERY

## 2020-03-12 PROCEDURE — 77030010507 HC ADH SKN DERMBND J&J -B: Performed by: ORTHOPAEDIC SURGERY

## 2020-03-12 PROCEDURE — 74011250637 HC RX REV CODE- 250/637: Performed by: ANESTHESIOLOGY

## 2020-03-12 PROCEDURE — 76210000020 HC REC RM PH II FIRST 0.5 HR: Performed by: ORTHOPAEDIC SURGERY

## 2020-03-12 PROCEDURE — 74011250636 HC RX REV CODE- 250/636: Performed by: ANESTHESIOLOGY

## 2020-03-12 PROCEDURE — 77030002986 HC SUT PROL J&J -A: Performed by: ORTHOPAEDIC SURGERY

## 2020-03-12 RX ORDER — METHYLPREDNISOLONE ACETATE 40 MG/ML
INJECTION, SUSPENSION INTRA-ARTICULAR; INTRALESIONAL; INTRAMUSCULAR; SOFT TISSUE AS NEEDED
Status: DISCONTINUED | OUTPATIENT
Start: 2020-03-12 | End: 2020-03-12 | Stop reason: HOSPADM

## 2020-03-12 RX ORDER — PROPOFOL 10 MG/ML
INJECTION, EMULSION INTRAVENOUS AS NEEDED
Status: DISCONTINUED | OUTPATIENT
Start: 2020-03-12 | End: 2020-03-12 | Stop reason: HOSPADM

## 2020-03-12 RX ORDER — LIDOCAINE HYDROCHLORIDE 10 MG/ML
INJECTION INFILTRATION; PERINEURAL AS NEEDED
Status: DISCONTINUED | OUTPATIENT
Start: 2020-03-12 | End: 2020-03-12 | Stop reason: HOSPADM

## 2020-03-12 RX ORDER — CLINDAMYCIN PHOSPHATE 900 MG/50ML
900 INJECTION INTRAVENOUS ONCE
Status: COMPLETED | OUTPATIENT
Start: 2020-03-12 | End: 2020-03-12

## 2020-03-12 RX ORDER — OXYCODONE HYDROCHLORIDE 5 MG/1
5 TABLET ORAL
Status: DISCONTINUED | OUTPATIENT
Start: 2020-03-12 | End: 2020-03-12 | Stop reason: HOSPADM

## 2020-03-12 RX ORDER — HYDROMORPHONE HYDROCHLORIDE 2 MG/ML
0.5 INJECTION, SOLUTION INTRAMUSCULAR; INTRAVENOUS; SUBCUTANEOUS
Status: DISCONTINUED | OUTPATIENT
Start: 2020-03-12 | End: 2020-03-12 | Stop reason: HOSPADM

## 2020-03-12 RX ORDER — ONDANSETRON 2 MG/ML
4 INJECTION INTRAMUSCULAR; INTRAVENOUS
Status: DISCONTINUED | OUTPATIENT
Start: 2020-03-12 | End: 2020-03-12 | Stop reason: HOSPADM

## 2020-03-12 RX ORDER — ACETAMINOPHEN 500 MG
1000 TABLET ORAL ONCE
Status: COMPLETED | OUTPATIENT
Start: 2020-03-12 | End: 2020-03-12

## 2020-03-12 RX ORDER — SODIUM CHLORIDE 0.9 % (FLUSH) 0.9 %
5-40 SYRINGE (ML) INJECTION AS NEEDED
Status: DISCONTINUED | OUTPATIENT
Start: 2020-03-12 | End: 2020-03-12 | Stop reason: HOSPADM

## 2020-03-12 RX ORDER — SODIUM CHLORIDE 0.9 % (FLUSH) 0.9 %
5-40 SYRINGE (ML) INJECTION EVERY 8 HOURS
Status: DISCONTINUED | OUTPATIENT
Start: 2020-03-12 | End: 2020-03-12 | Stop reason: HOSPADM

## 2020-03-12 RX ORDER — ALBUTEROL SULFATE 0.83 MG/ML
2.5 SOLUTION RESPIRATORY (INHALATION) AS NEEDED
Status: DISCONTINUED | OUTPATIENT
Start: 2020-03-12 | End: 2020-03-12 | Stop reason: HOSPADM

## 2020-03-12 RX ORDER — MIDAZOLAM HYDROCHLORIDE 1 MG/ML
2 INJECTION, SOLUTION INTRAMUSCULAR; INTRAVENOUS
Status: DISCONTINUED | OUTPATIENT
Start: 2020-03-12 | End: 2020-03-12 | Stop reason: HOSPADM

## 2020-03-12 RX ORDER — BUPIVACAINE HYDROCHLORIDE 5 MG/ML
INJECTION, SOLUTION EPIDURAL; INTRACAUDAL AS NEEDED
Status: DISCONTINUED | OUTPATIENT
Start: 2020-03-12 | End: 2020-03-12 | Stop reason: HOSPADM

## 2020-03-12 RX ORDER — SODIUM CHLORIDE, SODIUM LACTATE, POTASSIUM CHLORIDE, CALCIUM CHLORIDE 600; 310; 30; 20 MG/100ML; MG/100ML; MG/100ML; MG/100ML
1000 INJECTION, SOLUTION INTRAVENOUS CONTINUOUS
Status: DISCONTINUED | OUTPATIENT
Start: 2020-03-12 | End: 2020-03-12 | Stop reason: HOSPADM

## 2020-03-12 RX ORDER — LIDOCAINE HYDROCHLORIDE 10 MG/ML
0.1 INJECTION INFILTRATION; PERINEURAL AS NEEDED
Status: DISCONTINUED | OUTPATIENT
Start: 2020-03-12 | End: 2020-03-12 | Stop reason: HOSPADM

## 2020-03-12 RX ADMIN — SODIUM CHLORIDE, SODIUM LACTATE, POTASSIUM CHLORIDE, AND CALCIUM CHLORIDE 1000 ML: 600; 310; 30; 20 INJECTION, SOLUTION INTRAVENOUS at 09:16

## 2020-03-12 RX ADMIN — CLINDAMYCIN PHOSPHATE 900 MG: 900 INJECTION, SOLUTION INTRAVENOUS at 10:18

## 2020-03-12 RX ADMIN — PROPOFOL 20 MG: 10 INJECTION, EMULSION INTRAVENOUS at 10:22

## 2020-03-12 RX ADMIN — PROPOFOL 20 MG: 10 INJECTION, EMULSION INTRAVENOUS at 10:23

## 2020-03-12 RX ADMIN — PROPOFOL 30 MG: 10 INJECTION, EMULSION INTRAVENOUS at 10:33

## 2020-03-12 RX ADMIN — ACETAMINOPHEN 1000 MG: 500 TABLET, FILM COATED ORAL at 09:17

## 2020-03-12 RX ADMIN — PROPOFOL 50 MG: 10 INJECTION, EMULSION INTRAVENOUS at 10:21

## 2020-03-12 NOTE — DISCHARGE INSTRUCTIONS
Keep dressing clean, dry and intact until post op day number 2-3. Then may shower, pat dry and keep covered until follow up. Do not scrub incision or submerge under water. Move fingers, elevate, and ice to prevent swelling. No heavy lifting. DIET  · Clear liquids until no nausea or vomiting; then light diet for the first day. · Advance to regular diet on second day, unless your doctor orders otherwise. · If nausea and vomiting continues, call your doctor. PAIN  · Take pain medication as directed by your doctor. · Call your doctor if pain is NOT relieved by medication. CALL YOUR DOCTOR IF   · Excessive bleeding that does not stop after holding pressure over the area  · Temperature of 101 degrees F or above  · Excessive redness, swelling or bruising, and/ or green or yellow, smelly discharge from incision    AFTER ANESTHESIA   · For the first 24 hours: DO NOT Drive, Drink alcoholic beverages, or Make important decisions. · Be aware of dizziness following anesthesia and while taking pain medication. APPOINTMENT DATE/ TIME keep as arranged    YOUR DOCTOR'S PHONE NUMBER [phone]      DISCHARGE SUMMARY from Nurse    PATIENT INSTRUCTIONS:    After general anesthesia or intravenous sedation, for 24 hours or while taking prescription Narcotics:  · Limit your activities  · Do not drive and operate hazardous machinery  · Do not make important personal or business decisions  · Do  not drink alcoholic beverages  · If you have not urinated within 8 hours after discharge, please contact your surgeon on call. *  Please give a list of your current medications to your Primary Care Provider. *  Please update this list whenever your medications are discontinued, doses are      changed, or new medications (including over-the-counter products) are added. *  Please carry medication information at all times in case of emergency situations.       These are general instructions for a healthy lifestyle:    No smoking/ No tobacco products/ Avoid exposure to second hand smoke    Surgeon General's Warning:  Quitting smoking now greatly reduces serious risk to your health. Obesity, smoking, and sedentary lifestyle greatly increases your risk for illness    A healthy diet, regular physical exercise & weight monitoring are important for maintaining a healthy lifestyle    You may be retaining fluid if you have a history of heart failure or if you experience any of the following symptoms:  Weight gain of 3 pounds or more overnight or 5 pounds in a week, increased swelling in our hands or feet or shortness of breath while lying flat in bed. Please call your doctor as soon as you notice any of these symptoms; do not wait until your next office visit. Recognize signs and symptoms of STROKE:    F-face looks uneven    A-arms unable to move or move unevenly    S-speech slurred or non-existent    T-time-call 911 as soon as signs and symptoms begin-DO NOT go       Back to bed or wait to see if you get better-TIME IS BRAIN.

## 2020-03-12 NOTE — ANESTHESIA POSTPROCEDURE EVALUATION
Procedure(s):  LEFT RING FINGER TRIGGER RELEASE  RIGHT MIDDLE FINGER STEROID INJECTION. total IV anesthesia    Anesthesia Post Evaluation      Multimodal analgesia: multimodal analgesia used between 6 hours prior to anesthesia start to PACU discharge  Patient location during evaluation: bedside  Patient participation: complete - patient participated  Level of consciousness: awake and responsive to light touch  Pain management: adequate  Airway patency: patent  Anesthetic complications: no  Cardiovascular status: acceptable, hemodynamically stable, blood pressure returned to baseline and stable  Respiratory status: acceptable, unassisted, spontaneous ventilation and nonlabored ventilation  Hydration status: acceptable  Post anesthesia nausea and vomiting:  controlled      Vitals Value Taken Time   /58 3/12/2020 10:52 AM   Temp 36.6 °C (97.9 °F) 3/12/2020 10:41 AM   Pulse 51 3/12/2020 10:56 AM   Resp 16 3/12/2020 10:52 AM   SpO2 97 % 3/12/2020 10:56 AM   Vitals shown include unvalidated device data.

## 2020-03-12 NOTE — ANESTHESIA PREPROCEDURE EVALUATION
Anesthetic History   No history of anesthetic complications            Review of Systems / Medical History  Patient summary reviewed and pertinent labs reviewed    Pulmonary                   Neuro/Psych              Cardiovascular              Hyperlipidemia    Exercise tolerance: >4 METS     GI/Hepatic/Renal     GERD: well controlled      Liver disease     Endo/Other      Hypothyroidism: well controlled  Obesity, arthritis and cancer (Stomach CA in the past)     Other Findings              Physical Exam    Airway  Mallampati: II  TM Distance: > 6 cm  Neck ROM: normal range of motion   Mouth opening: Normal     Cardiovascular  Regular rate and rhythm,  S1 and S2 normal,  no murmur, click, rub, or gallop  Rhythm: regular  Rate: normal      Pertinent negatives: No peripheral edema   Dental  No notable dental hx       Pulmonary  Breath sounds clear to auscultation               Abdominal         Other Findings            Anesthetic Plan    ASA: 2  Anesthesia type: total IV anesthesia          Induction: Intravenous  Anesthetic plan and risks discussed with: Patient

## 2020-03-12 NOTE — BRIEF OP NOTE
BRIEF OPERATIVE NOTE    Date of Procedure: 3/12/2020   Preoperative Diagnosis: Trigger finger, left ring finger [M65.342]  Postoperative Diagnosis: Trigger finger, left ring finger [M65.342]    Procedure(s):  LEFT RING FINGER TRIGGER RELEASE  RIGHT MIDDLE FINGER STEROID INJECTION  Surgeon(s) and Role:     * Almetta Apgar, MD - Primary         Surgical Assistant: ANDREZ    Surgical Staff:  Circ-1: Juan Jose Leung Tech-1: [facility]; [place], [address]  Event Time In   Incision Start 1026   Incision Close 1035     Anesthesia: MAC   Estimated Blood Loss: MINIMAL  Specimens: * No specimens in log *   Findings: SEE DICTATION   Complications: NONE  Implants: * No implants in log *

## 2020-03-16 NOTE — OP NOTES
Operative Report       3/12/2020  Preoperative diagnosis:  Trigger finger, left ring finger [M65.342]    Postoperative diagnosis: Trigger finger, left ring finger [M65.342]    Surgeon(s) and Role:     * Anuel Melendez MD - Primary     Anesthesia: MAC Local with MAC. Procedures: Procedure(s):  LEFT RING FINGER TRIGGER RELEASE  RIGHT MIDDLE FINGER STEROID INJECTION        EBL/IV FLUIDS: Per Anesthesia. COMPLICATIONS: None. DISPOSITION: Stable to recovery room. INDICATIONS FOR PROCEDURE: The patient is a pleasant 68-year-old female with history of left ring finger trigger, right middle finger trigger carpal tunnel syndrome that has failed nonoperative measures. After both operative and   nonoperative treatment options were discussed, the decision was made to go ahead with a left ring finger trigger release and right middle finger trigger injection. Risks and benefits of the procedure were discussed including, but not limited to bleeding, infection, injury to adjacent structures consisting of tendon, artery, and nerve, need for additional procedures, wound dehiscence, scar formation, incomplete resolution of symptoms, recurrence of symptoms, and transient neuropraxia. Informed consent was obtained. PROCEDURE IN DETAIL: The patient was seen and marked in the preoperative suite. The patient was taken back to the OR, placed on the table in supine position with left upper extremities on hand tables. Left upper extremities were prepped and draped in standard sterile fashion. A formal timeout was performed confirming patient identification, preoperative antibiotics, and planned operative procedure. We infiltrated the planned incision site with lidocaine/Marcaine. A standard Longitudinal incision was made Overlying the  left ring A1 pulley. We dissected down bluntly with Ragnell retractors identifying the A1 pulley.   We released the A1 pulley both proximal and distal under direct vision in its entirety. The patient did have a second aponeurotic pulley that was released as well. We irrigated copiously with normal saline. The incision was closed with Prolene and Dermabond glue. The tourniquet was let down, the fingers had brisk capillary refill and a soft sterile dressing was placed. Next under separate procedure we injected the right middle finger with 1 cc of Depo-Medrol and 1 cc of lidocaine/Marcaine mix under sterile technique. POSTOPERATIVE CARE: Early motion. No heavy lifting.  Followup in 2 weeks for suture removal.    Closure: Primary    Complications: None     Signed By: Horace Ya MD

## 2020-08-13 ENCOUNTER — HOSPITAL ENCOUNTER (OUTPATIENT)
Dept: MAMMOGRAPHY | Age: 55
Discharge: HOME | End: 2020-08-13
Attending: INTERNAL MEDICINE

## 2020-08-13 DIAGNOSIS — Z12.31 SCREENING MAMMOGRAM FOR HIGH-RISK PATIENT: ICD-10-CM

## 2020-09-09 ENCOUNTER — EMPLOYEE WELLNESS (OUTPATIENT)
Dept: OTHER | Facility: CLINIC | Age: 55
End: 2020-09-09

## 2020-09-16 ENCOUNTER — HOSPITAL ENCOUNTER (OUTPATIENT)
Dept: MAMMOGRAPHY | Age: 55
Discharge: HOME | End: 2020-09-16
Attending: INTERNAL MEDICINE
Payer: COMMERCIAL

## 2020-09-16 DIAGNOSIS — R92.8 ABNORMAL MAMMOGRAM: ICD-10-CM

## 2020-09-16 PROCEDURE — 77062 BREAST TOMOSYNTHESIS BI: CPT

## 2021-08-25 ENCOUNTER — TRANSCRIBE ORDER (OUTPATIENT)
Dept: SCHEDULING | Age: 56
End: 2021-08-25

## 2021-08-25 DIAGNOSIS — R92.8 ABNORMAL MAMMOGRAM: Primary | ICD-10-CM

## 2021-08-31 ENCOUNTER — HOSPITAL ENCOUNTER (OUTPATIENT)
Dept: MAMMOGRAPHY | Age: 56
Discharge: HOME | End: 2021-08-31
Attending: INTERNAL MEDICINE
Payer: COMMERCIAL

## 2021-08-31 DIAGNOSIS — R92.8 ABNORMAL MAMMOGRAM: ICD-10-CM

## 2021-08-31 PROCEDURE — 77062 BREAST TOMOSYNTHESIS BI: CPT

## 2022-01-25 PROCEDURE — 88305 TISSUE EXAM BY PATHOLOGIST: CPT

## 2022-01-25 PROCEDURE — 88312 SPECIAL STAINS GROUP 1: CPT

## 2022-01-26 ENCOUNTER — HOSPITAL ENCOUNTER (OUTPATIENT)
Dept: LAB | Age: 57
Discharge: HOME | End: 2022-01-26

## 2022-02-10 ENCOUNTER — ANESTHESIA EVENT (OUTPATIENT)
Dept: SURGERY | Age: 57
End: 2022-02-10
Payer: COMMERCIAL

## 2022-02-11 ENCOUNTER — ANESTHESIA (OUTPATIENT)
Dept: SURGERY | Age: 57
End: 2022-02-11
Payer: COMMERCIAL

## 2022-02-11 ENCOUNTER — HOSPITAL ENCOUNTER (OUTPATIENT)
Age: 57
Setting detail: OUTPATIENT SURGERY
Discharge: HOME | End: 2022-02-11
Attending: ORTHOPAEDIC SURGERY | Admitting: ORTHOPAEDIC SURGERY
Payer: COMMERCIAL

## 2022-02-11 VITALS
OXYGEN SATURATION: 93 % | RESPIRATION RATE: 16 BRPM | TEMPERATURE: 97.8 F | BODY MASS INDEX: 30.55 KG/M2 | SYSTOLIC BLOOD PRESSURE: 115 MMHG | WEIGHT: 178 LBS | HEART RATE: 48 BPM | DIASTOLIC BLOOD PRESSURE: 56 MMHG

## 2022-02-11 DIAGNOSIS — M65.332 TRIGGER FINGER, LEFT MIDDLE FINGER: Primary | ICD-10-CM

## 2022-02-11 LAB
GLUCOSE BLD STRIP.AUTO-MCNC: 115 MG/DL (ref 65–100)
SERVICE CMNT-IMP: ABNORMAL

## 2022-02-11 PROCEDURE — 77030002916 HC SUT ETHLN J&J -A: Performed by: ORTHOPAEDIC SURGERY

## 2022-02-11 PROCEDURE — 76210000006 HC OR PH I REC 0.5 TO 1 HR: Performed by: ORTHOPAEDIC SURGERY

## 2022-02-11 PROCEDURE — 76210000020 HC REC RM PH II FIRST 0.5 HR: Performed by: ORTHOPAEDIC SURGERY

## 2022-02-11 PROCEDURE — 74011250636 HC RX REV CODE- 250/636: Performed by: ANESTHESIOLOGY

## 2022-02-11 PROCEDURE — 82962 GLUCOSE BLOOD TEST: CPT

## 2022-02-11 PROCEDURE — 26055 INCISE FINGER TENDON SHEATH: CPT | Performed by: ORTHOPAEDIC SURGERY

## 2022-02-11 PROCEDURE — 74011000250 HC RX REV CODE- 250: Performed by: ORTHOPAEDIC SURGERY

## 2022-02-11 PROCEDURE — 77030000032 HC CUF TRNQT ZIMM -B: Performed by: ORTHOPAEDIC SURGERY

## 2022-02-11 PROCEDURE — 76060000031 HC ANESTHESIA FIRST 0.5 HR: Performed by: ORTHOPAEDIC SURGERY

## 2022-02-11 PROCEDURE — 74011250636 HC RX REV CODE- 250/636: Performed by: ORTHOPAEDIC SURGERY

## 2022-02-11 PROCEDURE — 76010000154 HC OR TIME FIRST 0.5 HR: Performed by: ORTHOPAEDIC SURGERY

## 2022-02-11 PROCEDURE — 2709999900 HC NON-CHARGEABLE SUPPLY: Performed by: ORTHOPAEDIC SURGERY

## 2022-02-11 PROCEDURE — 74011250636 HC RX REV CODE- 250/636: Performed by: NURSE ANESTHETIST, CERTIFIED REGISTERED

## 2022-02-11 RX ORDER — ACETAMINOPHEN 500 MG
1000 TABLET ORAL
COMMUNITY

## 2022-02-11 RX ORDER — CEFAZOLIN SODIUM/WATER 2 G/20 ML
2 SYRINGE (ML) INTRAVENOUS ONCE
Status: COMPLETED | OUTPATIENT
Start: 2022-02-11 | End: 2022-02-11

## 2022-02-11 RX ORDER — HYDROMORPHONE HYDROCHLORIDE 2 MG/ML
0.5 INJECTION, SOLUTION INTRAMUSCULAR; INTRAVENOUS; SUBCUTANEOUS
Status: DISCONTINUED | OUTPATIENT
Start: 2022-02-11 | End: 2022-02-11 | Stop reason: HOSPADM

## 2022-02-11 RX ORDER — NALOXONE HYDROCHLORIDE 0.4 MG/ML
0.1 INJECTION, SOLUTION INTRAMUSCULAR; INTRAVENOUS; SUBCUTANEOUS
Status: DISCONTINUED | OUTPATIENT
Start: 2022-02-11 | End: 2022-02-11 | Stop reason: HOSPADM

## 2022-02-11 RX ORDER — LIDOCAINE HYDROCHLORIDE 10 MG/ML
0.1 INJECTION INFILTRATION; PERINEURAL AS NEEDED
Status: DISCONTINUED | OUTPATIENT
Start: 2022-02-11 | End: 2022-02-11 | Stop reason: HOSPADM

## 2022-02-11 RX ORDER — SODIUM CHLORIDE 0.9 % (FLUSH) 0.9 %
5-40 SYRINGE (ML) INJECTION EVERY 8 HOURS
Status: DISCONTINUED | OUTPATIENT
Start: 2022-02-11 | End: 2022-02-11 | Stop reason: HOSPADM

## 2022-02-11 RX ORDER — OXYCODONE HYDROCHLORIDE 5 MG/1
10 TABLET ORAL
Status: DISCONTINUED | OUTPATIENT
Start: 2022-02-11 | End: 2022-02-11 | Stop reason: HOSPADM

## 2022-02-11 RX ORDER — BUPIVACAINE HYDROCHLORIDE 5 MG/ML
INJECTION, SOLUTION EPIDURAL; INTRACAUDAL AS NEEDED
Status: DISCONTINUED | OUTPATIENT
Start: 2022-02-11 | End: 2022-02-11 | Stop reason: HOSPADM

## 2022-02-11 RX ORDER — FLUMAZENIL 0.1 MG/ML
0.2 INJECTION INTRAVENOUS AS NEEDED
Status: DISCONTINUED | OUTPATIENT
Start: 2022-02-11 | End: 2022-02-11 | Stop reason: HOSPADM

## 2022-02-11 RX ORDER — BUPIVACAINE HYDROCHLORIDE 2.5 MG/ML
INJECTION, SOLUTION EPIDURAL; INFILTRATION; INTRACAUDAL AS NEEDED
Status: DISCONTINUED | OUTPATIENT
Start: 2022-02-11 | End: 2022-02-11 | Stop reason: HOSPADM

## 2022-02-11 RX ORDER — LIDOCAINE HYDROCHLORIDE 10 MG/ML
INJECTION INFILTRATION; PERINEURAL AS NEEDED
Status: DISCONTINUED | OUTPATIENT
Start: 2022-02-11 | End: 2022-02-11 | Stop reason: HOSPADM

## 2022-02-11 RX ORDER — MIDAZOLAM HYDROCHLORIDE 1 MG/ML
2 INJECTION, SOLUTION INTRAMUSCULAR; INTRAVENOUS
Status: COMPLETED | OUTPATIENT
Start: 2022-02-11 | End: 2022-02-11

## 2022-02-11 RX ORDER — SODIUM CHLORIDE 0.9 % (FLUSH) 0.9 %
5-40 SYRINGE (ML) INJECTION AS NEEDED
Status: DISCONTINUED | OUTPATIENT
Start: 2022-02-11 | End: 2022-02-11 | Stop reason: HOSPADM

## 2022-02-11 RX ORDER — DIPHENHYDRAMINE HYDROCHLORIDE 50 MG/ML
12.5 INJECTION, SOLUTION INTRAMUSCULAR; INTRAVENOUS
Status: DISCONTINUED | OUTPATIENT
Start: 2022-02-11 | End: 2022-02-11 | Stop reason: HOSPADM

## 2022-02-11 RX ORDER — SODIUM CHLORIDE, SODIUM LACTATE, POTASSIUM CHLORIDE, CALCIUM CHLORIDE 600; 310; 30; 20 MG/100ML; MG/100ML; MG/100ML; MG/100ML
75 INJECTION, SOLUTION INTRAVENOUS CONTINUOUS
Status: DISCONTINUED | OUTPATIENT
Start: 2022-02-11 | End: 2022-02-11 | Stop reason: HOSPADM

## 2022-02-11 RX ORDER — PROPOFOL 10 MG/ML
INJECTION, EMULSION INTRAVENOUS AS NEEDED
Status: DISCONTINUED | OUTPATIENT
Start: 2022-02-11 | End: 2022-02-11 | Stop reason: HOSPADM

## 2022-02-11 RX ORDER — OXYCODONE HYDROCHLORIDE 5 MG/1
5 TABLET ORAL
Status: DISCONTINUED | OUTPATIENT
Start: 2022-02-11 | End: 2022-02-11 | Stop reason: HOSPADM

## 2022-02-11 RX ORDER — HYDROCODONE BITARTRATE AND ACETAMINOPHEN 5; 325 MG/1; MG/1
1 TABLET ORAL
Qty: 10 TABLET | Refills: 0 | Status: SHIPPED | OUTPATIENT
Start: 2022-02-11 | End: 2022-02-14

## 2022-02-11 RX ADMIN — PROPOFOL 20 MG: 10 INJECTION, EMULSION INTRAVENOUS at 07:57

## 2022-02-11 RX ADMIN — Medication 0.5 G: at 07:51

## 2022-02-11 RX ADMIN — PROPOFOL 40 MG: 10 INJECTION, EMULSION INTRAVENOUS at 07:50

## 2022-02-11 RX ADMIN — PROPOFOL 20 MG: 10 INJECTION, EMULSION INTRAVENOUS at 07:51

## 2022-02-11 RX ADMIN — Medication 1.5 G: at 07:56

## 2022-02-11 RX ADMIN — SODIUM CHLORIDE, SODIUM LACTATE, POTASSIUM CHLORIDE, AND CALCIUM CHLORIDE 75 ML/HR: 600; 310; 30; 20 INJECTION, SOLUTION INTRAVENOUS at 07:15

## 2022-02-11 RX ADMIN — PROPOFOL 20 MG: 10 INJECTION, EMULSION INTRAVENOUS at 07:52

## 2022-02-11 RX ADMIN — SODIUM CHLORIDE, SODIUM LACTATE, POTASSIUM CHLORIDE, AND CALCIUM CHLORIDE 75 ML/HR: 600; 310; 30; 20 INJECTION, SOLUTION INTRAVENOUS at 07:11

## 2022-02-11 RX ADMIN — MIDAZOLAM 2 MG: 1 INJECTION INTRAMUSCULAR; INTRAVENOUS at 07:43

## 2022-02-11 RX ADMIN — PROPOFOL 20 MG: 10 INJECTION, EMULSION INTRAVENOUS at 08:03

## 2022-02-11 RX ADMIN — PROPOFOL 20 MG: 10 INJECTION, EMULSION INTRAVENOUS at 07:55

## 2022-02-11 NOTE — H&P
History and Physical    Name: Maximilian Zhao  YOB: 1965  Gender: female  MRN: [id number]     CC:  hand pain     HPI: Patient is a 60 y.o. female with a chief complaint of Left middle and small clicking, locking and pain. The symptoms have been going on for 6-9 months. This has severely worsened over time, and she is no longer able to fully bend the middle finger. She is undergone multiple trigger finger releases in the past, and said that she did have cortisone injections for several of them prior to surgery, but she does not want to try any more cortisone injections, as she seems to always need surgery anyway.     ROS/Meds/PSH/PMH/FH/SH: I personally reviewed the patients standard intake form. Pertinents are discussed in the HPI     Physical Examination:    The patient is alert and oriented  Cardiovascular: regular rate and rhythm  Respiratory: Non labored breathing    Musculoskeletal:   Examination on the Left demonstrates Normal sensation to light touch in the median distribution, normal sensation in ulnar and radial distribution, negative carpal tunnel compression testing and Phalen testing. positive tenderness of the middle and small  A1 pulley with palpable clicking and positive  locking. The extensor tendons all track well over the MCP joints. Middle finger range of motion is extremely limited, and she is only able to flex to about 60 degrees at the proximal interphalangeal joint passively, and actively only flexes about 20 degrees.     Imaging / Electrodiagnostic Tests:      none     Assessment:       ICD-10-CM ICD-9-CM     1. Trigger middle finger of left hand  M65.332 727.03     2. Acquired trigger finger of left little finger  M65.352 727.03           Plan:  We discussed the diagnosis and different treatment options. We discussed observation, splinting, cortisone injections and surgical release of the A1 pulley.  We discussed that stenosing tenosynovitis at the level of the A1 pulley AKA trigger finger is a chronic condition regardless of how long the symptoms have been present, this most likely has been progressing for much longer than the symptoms were evident and it will likely persist for a long time without medical treatment. Furthermore, the many patients require surgical release at some point despite some short-term benefits of conservative treatment. After discussing in detail the patient elects to proceed with surgical treatment. Given the severe limitation in range of motion in her middle finger, I think it is reasonable to proceed with surgery rather than with a trial of cortisone injection first     Patient understands risks and benefits of left middle and small trigger finger release including but not limited to nerve injury, vessel injury, infection, failure to achieve desired results and possible need for additional surgery. Patient understands and wishes to proceed with surgery.     Franklin Crowder MD  02/11/22  7:20 AM

## 2022-02-11 NOTE — ANESTHESIA PREPROCEDURE EVALUATION
Relevant Problems   No relevant active problems       Anesthetic History   No history of anesthetic complications            Review of Systems / Medical History  Patient summary reviewed and pertinent labs reviewed    Pulmonary            Asthma : well controlled       Neuro/Psych   Within defined limits           Cardiovascular              Hyperlipidemia    Exercise tolerance: >4 METS     GI/Hepatic/Renal     GERD: well controlled          Comments: IBS Endo/Other    Diabetes (Borderline - diet control)  Hypothyroidism: well controlled  Obesity and arthritis     Other Findings              Physical Exam    Airway  Mallampati: II  TM Distance: 4 - 6 cm  Neck ROM: normal range of motion   Mouth opening: Normal     Cardiovascular    Rhythm: regular  Rate: normal         Dental  No notable dental hx       Pulmonary  Breath sounds clear to auscultation               Abdominal         Other Findings            Anesthetic Plan    ASA: 2  Anesthesia type: total IV anesthesia            Anesthetic plan and risks discussed with: Patient and Spouse negative detailed exam mouth

## 2022-02-11 NOTE — ANESTHESIA POSTPROCEDURE EVALUATION
Procedure(s):  LEFT MIDDLE AND LITTLE FINGER TRIGGER RELEASE.    total IV anesthesia    Anesthesia Post Evaluation      Multimodal analgesia: multimodal analgesia used between 6 hours prior to anesthesia start to PACU discharge  Patient location during evaluation: PACU  Patient participation: complete - patient participated  Level of consciousness: awake  Pain management: adequate  Airway patency: patent  Anesthetic complications: no  Cardiovascular status: acceptable and hemodynamically stable  Respiratory status: acceptable  Hydration status: acceptable  Comments: Acceptable for discharge from PACU. Post anesthesia nausea and vomiting:  none  Final Post Anesthesia Temperature Assessment:  Normothermia (36.0-37.5 degrees C)      INITIAL Post-op Vital signs:   Vitals Value Taken Time   /59 02/11/22 0837   Temp 36.4 °C (97.6 °F) 02/11/22 0817   Pulse 44 02/11/22 0838   Resp 16 02/11/22 0835   SpO2 95 % 02/11/22 0838   Vitals shown include unvalidated device data.

## 2022-02-11 NOTE — OP NOTES
Hand Surgery Operative Note      Nemesio Vides   60 y.o.   female      Pre-op diagnosis: Left middle and small Trigger Finger   Post op diagnosis: same      Procedure: Left  Middle and small Trigger Finger Release   Surgeon: Teodoro Chiu MD     Anesthesia: Local + MAC     Tourniquet time:   Total Tourniquet Time Documented:  Forearm (Left) - 7 minutes  Total: Forearm (Left) - 7 minutes        Procedure indications: Patient with catching and locking of the above mentioned finger(s) which have been recalcitrant to nonoperative measures. After Thorough discussion, the patient decided to proceed with surgical management. We discussed in detail surgical risks including scar, pain, bleeding, infection, anesthetic risks, neurovascular injury, need for further surgery,  weakness, stiffness, risk of death and potential risk of other unforseen complication. Procedure description:      Patient was placed in the supine position and after appropriate time-out and side, site and procedure confirmed. Local anesthesia was infiltrated with Lidocaine 2% plain and 0.25% Bupivacaine plain. A longitudinal incision was made along the middle finger A1 pulley under loupe magnification. Blunt dissection was used to identify the A1 pulley as well as the neurovascular bundle on each side of the flexor tendon. Blunt retraction was used to protect the neurovascular bundles. Sharp incision was made on top of the A1 pulley and scissor dissection was used to extend the sheath incision proximally to release the palmar pulley and distally until the A2 pulley was encountered. A longitudinal incision was made along the small finger A1 pulley under loupe magnification. Blunt dissection was used to identify the A1 pulley as well as the neurovascular bundle on each side of the flexor tendon. Blunt retraction was used to protect the neurovascular bundles.  Sharp incision was made on top of the A1 pulley and scissor dissection was used to extend the sheath incision proximally to release the palmar pulley and distally until the A2 pulley was encountered. The flexor tendons were then mobilized and not catching or clicking was identified. Wound was irrigated and hemostasis was obtained with bipolar cautery. Wound then closed with 4-0 nylon and sterile dressing applied. Disposition: To PACU with no complications and follow up per routine. Patient is instructed to remove dressings in five days and other precautions include avoidance of heavy and repetitive lifting for 2 weeks, when an appointment for follow up and suture removal will take place.      Kylie Lanier MD  02/11/22  8:16 AM

## 2022-02-11 NOTE — DISCHARGE INSTRUCTIONS
Hand Surgery Postoperative  Instructions:      Weightbearing or Lifting:  Limit  weight  lifting  to  less  than  1  pound  (coffee  mug)  for  the  first  2  weeks  after  surgery. Dressing  instructions:    Keep  your  dressing  and/or  splint  clean  and  dry  at  all  times. You  can  remove  your  dressing  on  post-operative  day  #7  and  change  with  a  dry/sterile  dressing  or  Band-Aids  as  needed  thereafter. Showering  Instructions:  May  shower  But keep surgical dressing clean and dry until removed as explained above. After dressing is removed, you may allow soapy water to run through the incision during showers but do not scrub. After each shower, pat dry and apply a dry dressing. Do  not  soak  your  Incision in still water or bathtub  for  3  weeks  after  surgery. If  the  incision  gets  wet otherwise,  pat  dry  and  do  not  scrub  the  incision. Do  not  apply  cream  or  lotion  to  incision      Pain  Control:  - You  have  been  given  a  prescription  to  be  taken  as  directed  for  post-operative  pain  control. In  addition,  elevate  the  operative  extremity  above  the  heart  at  all  times  to  prevent  swelling  and  throbbing  pain. - If you develop constipation while taking narcotic pain medications (Norco, Hydrocodone, Percocet, Oxycodone, Dilaudid, Hydromorphone) take  over-the-counter  Colace,  100mg  by  mouth  twice  a  Day. - Nausea  is  a  common  side  effect  of  many  pain  medications. You  will  want  to  eat something  before  taking  your  pain  medicine  to  help  prevent  Nausea. - If  you  are  taking  a  prescription  pain  medication  that  contains  acetaminophen,  we  recommend  that  you  do  not  take  additional  over  the  counter  acetaminophen  (Tylenol®).       Other  pain  relieving  options:   - Using  a  cold  pack  to  ice  the  affected  area  a  few  times  a  day  (15  to  20  minutes  at  a  time)  can help  to  relieve  pain,  reduce  swelling  and  bruising.      - Elevation  of  the  affected  area  can  also  help  to  reduce  pain  and  swelling. Did  you  receive  a  nerve  Block? A  nerve  block  can  provide  pain  relief  for  one  hour  to  two  days  after  your  surgery. As  long  as  the  nerve  block  is  working,  you  will  experience  little  or  no  sensation  in  the  area  the  surgeon  operated  on. As  the  nerve  block  wears  off,  you  will  begin  to  experience  pain  or  discomfort. It  is  very  important  that  you  begin  taking  your  prescribed  pain  medication  before  the  nerve  block  fully  wears  off. The first sign that the nerve block is wearing off is tingling in your fingers. Treating  your  pain  at  the  first  sign  of  the  block  wearing  off  will  ensure  your  pain  is  better  controlled  and  more  tolerable  when  full-sensation  returns. Do  not  wait  until  the  pain  is  intolerable,  as  the  medicine  will  be  less  effective. It  is  better  to  treat  pain  in  advance  than  to  try  and  catch  up. General  Anesthesia or Sedation:      If  you  did  not  receive  a  nerve  block  during  your  surgery,  you  will  need  to  start  taking  your  pain  medication  shortly  after  your  surgery  and  should  continue  to  do  so  as  prescribed  by  your  surgeon. Please  call  [phone]  with any concern and ask to speak with Franci Lux. Concerning problems include:      -  Excessive  redness  of  the  incisions      -  Drainage  for  more  than  2  Days after surgery or any foul smelling drainage  -  Fever  of  more  than  101.5  F      Please  call  [phone]  if  you  do  not  receive  or  are  unsure  of  your  first  follow-up  appointment. You  should  see  the  doctor  10-14  days  after  your  Surgery. Thank you for choosing me and [address] [place] [address] for your care.  I will go above and beyond to ensure you receive the best care possible. Reina Kurtz MD    MEDICATION INTERACTION:  During your procedure you potentially received a medication or medications which may reduce the effectiveness of oral contraceptives. Please consider other forms of contraception for 1 month following your procedure if you are currently using oral contraceptives as your primary form of birth control. In addition to this, we recommend continuing your oral contraceptive as prescribed, unless otherwise instructed by your physician, during this time    After general anesthesia or intravenous sedation, for 24 hours or while taking prescription Narcotics:  · Limit your activities  · A responsible adult needs to be with you for the next 24 hours  · Do not drive and operate hazardous machinery  · Do not make important personal or business decisions  · Do not drink alcoholic beverages  · If you have not urinated within 8 hours after discharge, and you are experiencing discomfort from urinary retention, please go to the nearest ED. · If you have sleep apnea and have a CPAP machine, please use it for all naps and sleeping. · Please use caution when taking narcotics and any of your home medications that may cause drowsiness. *  Please give a list of your current medications to your Primary Care Provider. *  Please update this list whenever your medications are discontinued, doses are      changed, or new medications (including over-the-counter products) are added. *  Please carry medication information at all times in case of emergency situations. These are general instructions for a healthy lifestyle:  No smoking/ No tobacco products/ Avoid exposure to second hand smoke  Surgeon General's Warning:  Quitting smoking now greatly reduces serious risk to your health.   Obesity, smoking, and sedentary lifestyle greatly increases your risk for illness  A healthy diet, regular physical exercise & weight monitoring are important for maintaining a healthy lifestyle    You may be retaining fluid if you have a history of heart failure or if you experience any of the following symptoms:  Weight gain of 3 pounds or more overnight or 5 pounds in a week, increased swelling in our hands or feet or shortness of breath while lying flat in bed. Please call your doctor as soon as you notice any of these symptoms; do not wait until your next office visit.

## 2022-06-07 ENCOUNTER — OFFICE VISIT (OUTPATIENT)
Dept: GYNECOLOGY | Age: 57
End: 2022-06-07
Payer: COMMERCIAL

## 2022-06-07 VITALS
HEIGHT: 65 IN | SYSTOLIC BLOOD PRESSURE: 120 MMHG | BODY MASS INDEX: 31.16 KG/M2 | WEIGHT: 187 LBS | DIASTOLIC BLOOD PRESSURE: 70 MMHG

## 2022-06-07 DIAGNOSIS — Z12.72 VAGINAL PAP SMEAR: ICD-10-CM

## 2022-06-07 DIAGNOSIS — Z12.31 VISIT FOR SCREENING MAMMOGRAM: Primary | ICD-10-CM

## 2022-06-07 PROCEDURE — 99396 PREV VISIT EST AGE 40-64: CPT | Performed by: OBSTETRICS & GYNECOLOGY

## 2022-06-07 RX ORDER — ERGOCALCIFEROL (VITAMIN D2) 1250 MCG
50000 CAPSULE ORAL WEEKLY
COMMUNITY
Start: 2022-06-06 | End: 2022-08-23

## 2022-06-07 RX ORDER — LORAZEPAM 1 MG/1
TABLET ORAL
COMMUNITY
Start: 2022-06-06

## 2022-06-07 RX ORDER — OMEPRAZOLE 40 MG/1
40 CAPSULE, DELAYED RELEASE ORAL DAILY
COMMUNITY
Start: 2017-11-03

## 2022-06-07 RX ORDER — FAMOTIDINE 20 MG/1
TABLET, FILM COATED ORAL
COMMUNITY
Start: 2022-04-13

## 2022-06-07 ASSESSMENT — PATIENT HEALTH QUESTIONNAIRE - PHQ9
1. LITTLE INTEREST OR PLEASURE IN DOING THINGS: 0
2. FEELING DOWN, DEPRESSED OR HOPELESS: 0
SUM OF ALL RESPONSES TO PHQ9 QUESTIONS 1 & 2: 0
SUM OF ALL RESPONSES TO PHQ QUESTIONS 1-9: 0

## 2022-06-07 NOTE — PROGRESS NOTES
DARIUS Lee is a 60 y.o. female seen for annual GYN exam.  She works in [employer]. Her  is being evaluated for slipped ventricle syndrome at [facility]    Past Medical History, Past Surgical History, Family history, Social History, and Medications were all reviewed with the patient today and updated as necessary. Current Outpatient Medications   Medication Sig    ergocalciferol (ERGOCALCIFEROL) 1.25 MG (58356 UT) capsule Take 50,000 Units by mouth once a week    omeprazole (PRILOSEC) 40 MG delayed release capsule Take 40 mg by mouth daily    LORazepam (ATIVAN) 1 MG tablet     acetaminophen (TYLENOL) 500 MG tablet Take 1,000 mg by mouth every 6 hours as needed    alosetron (LOTRONEX) 0.5 MG tablet Take 1 tablet by mouth as needed    choline fenofibrate (TRILIPIX) 45 MG CPDR delayed release capsule Take 45 mg by mouth    colestipol (COLESTID) 1 g tablet Take 1 tablet by mouth as needed    escitalopram (LEXAPRO) 10 MG tablet 1 tablet daily    ibuprofen (ADVIL;MOTRIN) 200 MG tablet Take 400 mg by mouth every 6 hours as needed    levothyroxine (SYNTHROID) 100 MCG tablet Take 100 mcg by mouth every morning (before breakfast)    famotidine (PEPCID) 20 MG tablet     LORazepam (ATIVAN) 0.5 MG tablet Take 0.5 mg by mouth 2 times daily as needed. (Patient not taking: Reported on 6/7/2022)    predniSONE (DELTASONE) 5 MG tablet Take as directed per package instruction for the 5mg dose pack Indications: Inflammation (Patient not taking: Reported on 6/7/2022)     No current facility-administered medications for this visit.      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 9)     BMI 30.55    Osteoarthritis     fingers     PUD (peptic ulcer disease) last 1987    Gastric ulcers/duodenal carcinoid, 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    Situational anxiety     Splenic artery aneurysm ([facility] [address] 06/30/2015 2018--  1 cm, PCP follows     Past Surgical History:   Procedure Laterality Date    ADENOIDECTOMY      BREAST RECONSTRUCTION  2016    BREAST REDUCTION SURGERY  5/7/2014    BILATERAL BREAST REDUCTION performed by [facility]ine [facility] at [facility]  83, 80    x 2    CHOLECYSTECTOMY, LAPAROSCOPIC  1990    COLONOSCOPY      GI      carcinoid tumor removal stomach    HEENT  1980's    jaw surgery x2    HEENT      BLEPHAROPTOSIS BILATERAL    HYSTERECTOMY  2008    complete with bso    ORTHOPEDIC SURGERY Right     middle finger, ring surgery joint    OTHER SURGICAL HISTORY      Broken toe    SHOULDER ARTHROSCOPY Right 2013         UPPER GASTROINTESTINAL ENDOSCOPY  4/2015    WISDOM TOOTH EXTRACTION       Family History   Problem Relation Age of Onset    Hypertension Sister     Diabetes Sister     Heart Disease Sister     No Known Problems Brother     Arrhythmia Father     Hypertension Father     Arrhythmia Mother     Diabetes Father     Breast Cancer Neg Hx     Heart Disease Mother     Osteoarthritis Mother     COPD Mother     Stroke Mother     Heart Disease Father       Social History     Tobacco Use    Smoking status: Former Smoker Packs/day: 0.50     Quit date: 8/10/1980     Years since quittin.8    Smokeless tobacco: Never Used   Substance Use Topics    Alcohol use: No       Social History     Substance and Sexual Activity   Sexual Activity Yes    Partners: Male     OB History    Para Term  AB Living   2 2 0 0 0 0   SAB IAB Ectopic Molar Multiple Live Births   0 0 0 0 0 0      # Outcome Date GA Lbr Michael/2nd Weight Sex Delivery Anes PTL Lv   2 Para            1 Para               Obstetric Comments   C-Sections       Health Maintenance  Mammogram:   Colonoscopy: 2020  Bone Density:  Pap smear: Hysterectomy      Review of Systems  General: Not Present- Chills, Fever, Fatigue, Insomnia, Hot flashes/Night sweats, Weight gain  Skin: Not Present- Bruising, Change in Wart/Mole, Excessive Sweating, Itching, Nail Changes, New Lesions, Rash, Skin Color Changes and Ulcer. HEENT: Not Present- Headache, Blurred Vision, Double Vision, Glaucoma, Visual Disturbances, Hearing Loss, Ringing in the Ears, Vertigo, Nose Bleed, Bleeding Gums, Hoarseness and Sore Throat. Neck: Not Present- Neck Pain and Neck Swelling. Respiratory: Not Present- Cough, Difficulty Breathing and Difficulty Breathing on Exertion. Breast: Not Present- Breast Mass, Breast Pain, Breast Swelling, Nipple Discharge, Nipple Pain, Recent Breast Size Changes and Skin Changes. Cardiovascular: Not Present- Abnormal Blood Pressure, Chest Pain, Edema, Fainting / Blacking Out, Palpitations, Shortness of Breath and Swelling of Extremities. Gastrointestinal: Not Present- Abdominal Pain, Abdominal Swelling, Bloating, Change in Bowel Habits, Constipation, Diarrhea, Difficulty Swallowing, Gets full quickly at meals, Nausea, Rectal Bleeding and Vomiting.   Female Genitourinary: Not Present- Dysmenorrhea, Dyspareunia, Decreased libido, Excessive Menstrual Bleeding, Menstrual Irregularities, Pelvic Pain, Urinary Complaints, Vaginal Discharge, Vaginal itching/burning, Vaginal odor  Musculoskeletal: Not Present- Joint Pain and Muscle Pain. Neurological: Not Present- Dizziness, Fainting, Headaches and Seizures. Psychiatric: Not Present- Anxiety, Depression, Mood changes and Panic Attacks. Endocrine: Not Present- Appetite Changes, Cold Intolerance, Excessive Thirst, Excessive Urination and Heat Intolerance. Hematology: Not Present- Abnormal Bleeding, Easy Bruising and Enlarged Lymph Nodes. PHYSICAL EXAM:     /70   Ht 5' 4.5\" (1.638 m)   Wt 187 lb (84.8 kg)   BMI 31.60 kg/m²     Physical Exam   General   Mental Status - Alert. General Appearance - Cooperative. Integumentary   General Characteristics: Overall examination of the patient's skin reveals - no rashes and no suspicious lesions. Head and Neck  Head - normocephalic, atraumatic with no lesions or palpable masses. Neck Note: Normal   Thyroid   Gland Characteristics - normal size and consistency and no palpable nodules. Chest and Lung Exam   Chest and lung exam reveals - on auscultation, normal breath sounds, no adventitious sounds and normal vocal resonance. Breast   Breast - Left - Normal. Right - Normal.     Cardiovascular   Cardiovascular examination reveals - normal heart sounds, regular rate and rhythm with no murmurs. Abdomen   Inspection: - Inspection Normal.   Palpation/Percussion: Palpation and Percussion of the abdomen reveal - Non Tender, No Rebound tenderness, No Rigidity (guarding), No hepatosplenomegaly, No Palpable abdominal masses and Soft. Auscultation: Auscultation of the abdomen reveals - Bowel sounds normal.     Female Genitourinary     External Genitalia   Vulva: - Normal. Perineum - Normal. Bartholin's Gland - Bilateral - Normal. Clitoris - Normal.   Introitus: Characteristics - Normal.   Urethra: Characteristics - Normal.     Speculum & Bimanual   Vagina: Vaginal Mucosa -  Vaginal Wall: - Normal.   Vaginal Lesions - None.    Cervix: Characteristics - Surgically absent  Uterus: Characteristics - Surgically absent  Adnexa: - Normal.   Bladder - Normal.     Peripheral Vascular   Normal    Neuropsychiatric   Examination of related systems reveals - The patient is well-nourished and well-groomed. Mental status exam performed with findings of - Oriented X3 with appropriate mood and affect. Musculoskeletal  Normal      General Lymphatics  Normal           Medical problems and test results were reviewed with the patient today. ASSESSMENT and PLAN    Al Baugh was seen today for gynecologic exam.    Diagnoses and all orders for this visit:    Visit for screening mammogram  -     [facility] JESUS DIGITAL SCREEN BILATERAL; Future    Vaginal Pap smear  -     Cancel: PAP LB, Reflex HPV ASCUS; Future  -     PAP LB, Reflex HPV ASCUS        No follow-up provider specified.       Alexis Lane MD  6/7/2022

## 2022-06-11 LAB
CYTOLOGIST CVX/VAG CYTO: NORMAL
CYTOLOGY CVX/VAG DOC THIN PREP: NORMAL
HPV REFLEX: NORMAL
Lab: NORMAL
PATH REPORT.FINAL DX SPEC: NORMAL
STAT OF ADQ CVX/VAG CYTO-IMP: NORMAL

## 2022-07-19 LAB
CHOLEST SERPL-MCNC: 199 MG/DL
GLUCOSE SERPL-MCNC: 138 MG/DL (ref 65–100)
HDLC SERPL-MCNC: 30 MG/DL (ref 40–60)
LDLC SERPL CALC-MCNC: 113.4 MG/DL
TRIGL SERPL-MCNC: 278 MG/DL (ref 35–150)

## 2022-10-19 RX ORDER — LIDOCAINE 5% 5 G/100G
CREAM TOPICAL
Qty: 45 G | Refills: 2 | Status: SHIPPED | OUTPATIENT
Start: 2022-10-19

## 2022-10-19 RX ORDER — PREDNISONE 5 MG/1
TABLET ORAL
Qty: 1 EACH | Refills: 2 | Status: SHIPPED | OUTPATIENT
Start: 2022-10-19

## 2022-10-19 NOTE — PROGRESS NOTES
She presents today to evaluate her dorsal foot nodule and pain  Onset approximately 10/14/2022 with no trauma    Exam:  Right dorsal foot multiloculated soft tissue nodules suspected cystic etiology  Right dorsal foot soft tissue sensitivity over the largest central soft tissue mass    Plan today is to try medication and if no resolution, MRI scan and potential aspiration or surgery    Prescribed:  Prednisone 5 mg Sterapred pack; #48; take per package insert; 2 refills  Topical 5% Neurontin/Xylocaine to apply to dorsal foot up to 4 times a day. She has lacing technique that she utilized today.

## 2022-11-01 ENCOUNTER — OFFICE VISIT (OUTPATIENT)
Dept: ORTHOPEDIC SURGERY | Age: 57
End: 2022-11-01
Payer: COMMERCIAL

## 2022-11-01 DIAGNOSIS — M76.31 IT BAND SYNDROME, RIGHT: Primary | ICD-10-CM

## 2022-11-01 PROCEDURE — 20610 DRAIN/INJ JOINT/BURSA W/O US: CPT | Performed by: ORTHOPAEDIC SURGERY

## 2022-11-01 RX ORDER — TRIAMCINOLONE ACETONIDE 40 MG/ML
80 INJECTION, SUSPENSION INTRA-ARTICULAR; INTRAMUSCULAR ONCE
Status: COMPLETED | OUTPATIENT
Start: 2022-11-01 | End: 2022-11-01

## 2022-11-01 RX ADMIN — TRIAMCINOLONE ACETONIDE 80 MG: 40 INJECTION, SUSPENSION INTRA-ARTICULAR; INTRAMUSCULAR at 13:13

## 2022-11-01 NOTE — PATIENT INSTRUCTIONS
Iliotibial Band Syndrome:       What is iliotibial band syndrome? Iliotibial band syndrome is inflammation and pain on the outer side of the knee. The iliotibial band is a layer of connective tissue. It begins at a muscle near the outer side of your hip, travels down the outer side of your thigh, crosses the outer side of the knee, and attaches to the outer side of your upper shin bone (tibia). How does it occur? Iliotibial band syndrome occurs when this band repeatedly rubs over the bump of the thigh bone (femur) near the knee, causing the band to be irritated. This most often occurs in running. This condition can result from:   having a tight iliotibial band   having tight muscles in your hip, pelvis, or leg   your legs not being the same length   running on sloped surfaces   running in shoes with a lot of wear on the outside of the heel     How is it treated? Treatment includes the following:   Place an ice pack over your iliotibial band for 20 to 30 minutes every 3 or 4 hours for 2 to 3 days or until the pain goes away. You can also do ice massage. Massage your knee with ice by freezing water in a Styrofoam cup. Peel the top of the cup away to expose the ice and hold onto the bottom of the cup while you rub ice over your knee for 5 to 10 minutes. Take an anti-inflammatory medicine, according to your healthcare provider's prescription. Adults aged 72 years and older should not take non-steroidal anti-inflammatory medicine for more than 7 days without their healthcare provider's approval.   Do the stretching and strengthening exercises recommended by your healthcare provider or physical therapist.   Your provider may give you an injection of a corticosteroid medicine to reduce the inflammation and pain. While your knee is healing, you will need to change your sport or activity to one that does not make your condition worse. For example, you may need to bicycle instead of run.      How long will the effects last?   The length of recovery depends on many factors such as your age, health, and if you have had a previous injury. Recovery time also depends on the severity of the injury. A mild injury may recover within a few weeks, whereas a severe injury may take 6 weeks or longer to recover. You need to stop doing the activities that cause pain until your iliotibial band has healed. If you continue doing activities that cause pain, your symptoms will return and it will take longer to recover. When can I return to my normal activities? Everyone recovers from an injury at a different rate. Return to your activities will be determined by how soon your knee recovers, not by how many days or weeks it has been since your injury has occurred. In general, the longer you have symptoms before you start treatment, the longer it will take to get better. The goal of rehabilitation is to return you to your normal activities as soon as is safely possible. If you return too soon you may worsen your injury. You may safely return to your normal activities when, starting from the top of the list and progressing to the end, each of the following is true:   your injured knee can be fully straightened and bent without pain   your knee and leg have regained normal strength compared to the uninjured knee and leg   you are able to walk or jog straight ahead without limping     How can I prevent iliotibial band syndrome? Iliotibial band syndrome is best prevented by warming up properly and doing stretching exercises before sports or other physical activity. Iliotibial Band Syndrome Rehabilitation Exercises   You may do all of these exercises right away. Iliotibial band stretch: Standing: Cross one leg in front of the other leg and bend down and touch your toes. You can move your hands across the floor toward the front leg and you will feel more stretch on the outside of your thigh on the other side.  Hold this position for 15 to 30 seconds. Return to the starting position. Repeat 3 times. Reverse the positions of your legs and repeat. Iliotibial band stretch: Side-leaning: Stand sideways near a wall. Place one hand on the wall for support. Cross the leg farthest from the wall over the other leg, keeping the foot closest to the wall flat on the floor. Lean your hips into the wall. Hold the stretch for 15 seconds, repeat 3 times, and then switch legs and repeat the exercise another 3 times. Standing calf stretch: Facing a wall, put your hands against the wall at about eye level. Keep one leg back with the heel on the floor, and the other leg forward. Turn your back foot slightly inward (as if you were pigeon-toed) as you slowly lean into the wall until you feel a stretch in the back of your calf. Hold for 15 to 30 seconds. Repeat 3 times and then switch the position of your legs and repeat the exercise 3 times. Do this exercise several times each day. Hamstring stretch on wall: Lie on your back with your buttocks close to a doorway, and extend your legs straight out in front of you along the floor. Raise one leg and rest it against the wall next to the door frame. Your other leg should extend through the doorway. You should feel a stretch in the back of your thigh. Hold this position for 15 to 30 seconds. Repeat 3 times and then switch legs and do the exercise again. Quadriceps stretch: Stand an arm's length away from the wall with your injured leg farthest from the wall. Facing straight ahead, brace yourself by keeping one hand against the wall. With your other hand, grasp the ankle of your injured leg and pull your heel toward your buttocks. Don't arch or twist your back. Keep your knees together. Hold this stretch for 15 to 30 seconds. Wall squat with a ball: Stand with your back, shoulders, and head against a wall and look straight ahead.  Keep your shoulders relaxed and your feet 2 feet away from the wall and a shoulder's width apart. Place a soccer or basketball-sized ball behind your back. Keeping your back upright, slowly squat down to a 45-degree angle. Your thighs will not yet be parallel to the floor. Hold this position for 10 seconds and then slowly slide back up the wall. Repeat 10 times. Build up to 3 sets of 10. Side-lying leg lift: Lying on your uninjured side, tighten the front thigh muscles on your top leg and lift that leg 8 to 10 inches away from the other leg. Keep the leg straight and lower slowly. Do 3 sets of 10      Knee stabilization: Wrap a piece of elastic tubing around the ankle of the uninjured leg. Tie a knot in the other end of the tubing and close it in a door. Stand facing the door on the leg without tubing and bend your knee slightly, keeping your thigh muscles tight. While maintaining this position, move the leg with the tubing straight back behind you. Do 3 sets of 10. Turn 90 degrees so the leg without tubing is closest to the door. Move the leg with tubing away from your body. Do 3 sets of 10. Turn 90 degrees again so your back is to the door. Move the leg with tubing straight out in front of you. Do 3 sets of 10. Turn your body 90 degrees again so the leg with tubing is closest to the door. Move the leg with tubing across your body. Do 3 sets of 10. Hold onto a chair if you need help balancing. This exercise can be made even more challenging by standing on a pillow while you move the leg with tubing. Iliotibial band stretch: Side-bending: Cross one leg in front of the other leg and lean in the opposite direction from the front leg. Reach the arm on the side of the back leg over your head while you do this. Hold this position for 15 to 30 seconds. Return to the starting position. Repeat 3 times and then switch legs and repeat the exercise. Clam exercise: Lie on your uninured side with your hips and knees bent and feet together.  Slowly raise your top leg toward the ceiling while keeping your heels touching each other. Hold for 2 seconds and lower slowly. Do 3 sets of 10 repetitions. Adapted from [employer]  © 2009 [facility] and/or its affiliates.

## 2022-11-01 NOTE — PROGRESS NOTES
Name: Bre Rodriguez  YOB: 1965  Gender: female  MRN: [id number]    CC:   Chief Complaint   Patient presents with    Hip Pain     Right hip   , Right lateral hip pain    HPI: Patient comes in complaining of Right hip pain which is lateral.  Patient states the pain is activity related. It hurts to stand. It does not seem to radiate. It is more painful when lying on the affected side. No back pain. No paresthesia's. No bowel or bladder issues. No prior history. No definite injury. Pain can be quite severe with activities of daily living. In the past has had symptoms similar also responded to injection. Has not been performing home exercises. 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    Situational anxiety     Splenic artery aneurysm ([facility] 06/30/2015    2018--  1 cm, PCP follows     Past Surgical History:   Procedure Laterality Date    ADENOIDECTOMY      BREAST RECONSTRUCTION  2016    BREAST REDUCTION SURGERY 2014    BILATERAL BREAST REDUCTION performed by Joby Gutierrez MD at [address] [facility]  [address]    x 2    CHOLECYSTECTOMY, LAPAROSCOPIC      COLONOSCOPY      GI      carcinoid tumor removal stomach    HEENT      jaw surgery x2    HEENT      BLEPHAROPTOSIS BILATERAL    HYSTERECTOMY (CERVIX STATUS UNKNOWN)      complete with bso    ORTHOPEDIC SURGERY Right     middle finger, ring surgery joint    OTHER SURGICAL HISTORY      Broken toe    SHOULDER ARTHROSCOPY Right 2013         UPPER GASTROINTESTINAL ENDOSCOPY  2015    WISDOM TOOTH EXTRACTION       Family History   Problem Relation Age of Onset    Hypertension Sister     Diabetes Sister     Heart Disease Sister     No Known Problems Brother     Arrhythmia Father     Hypertension Father     Arrhythmia Mother     Diabetes Father     Breast Cancer Neg Hx     Heart Disease Mother     Osteoarthritis Mother     COPD Mother     Stroke Mother     Heart Disease Father      Social History     Socioeconomic History    Marital status:      Spouse name: Not on file    Number of children: Not on file    Years of education: Not on file    Highest education level: Not on file   Occupational History    Not on file   Tobacco Use    Smoking status: Former     Packs/day: 0.50     Types: Cigarettes     Quit date: 8/10/1980     Years since quittin.2    Smokeless tobacco: Never   Substance and Sexual Activity    Alcohol use: No    Drug use: No    Sexual activity: Yes     Partners: Male   Other Topics Concern    Not on file   Social History Narrative    Denies physical or sexual abuse     Social Determinants of Health     Financial Resource Strain: Not on file   Food Insecurity: Not on file   Transportation Needs: Not on file   Physical Activity: Not on file   Stress: Not on file   Social Connections: Not on file   Intimate Partner Violence: Not on file   Housing Stability: Not on file        No flowsheet data found.     Review of Systems  Are noted on the patient medical history form in the chart and are reviewed today. Pertinent positives and negatives are addressed with the patient, particularly those related to musculoskeletal concerns. Non-orthopaedic concerns were referred back to the primary care physician. PE:    General appearance is that of a healthy patient, alert and oriented, in no distress. Neck shows no significant abnormalities. Back and bilateral hips show no significant abnormalities  with good ROM and no referral of pain to LE with movement. No tenderness to bilateral hips. R and L upper extremities show no significant abnormalities. Dorsalis pedis pulses are 2+ and symmetrical.    Motor and sensory exam is intact and equal in both feet. .    Leg lengths are equal.  The patient is nontender in the lumbar spine. Negative SLR. Good active motion of the lumbar spine. HIP  Right left   Skin Intact    Atrophy None noted None noted   Effusion/Swelling  No swelling None noted   ROM  Good AROM of hip and knee Full of hip and knee. Strength No weakness No weakness   Palpation Tenderness over the lateral hip; positive Anselmo sign Non tender throughout   Leg Length  Equal Equal   Instability  None noted   Myotomes Normal Normal   Dermatomes  Normal Normal     X-rays: Deferred    Impression:     ICD-10-CM    1. It band syndrome, right  M76.31 triamcinolone acetonide (KENALOG-40) injection 80 mg     DRAIN/INJECT LARGE JOINT/BURSA      , Right hip bursitis    Plan: I had a long discussion with the patient and family regarding the natural history of the problem, as well as treatment options. Treatment:   Home exercise showed in the office this was provided.   Procedure:  Right trochanteric bursa injection  The patient desires injection of the trochanteric bursa after discussion of risks and benefits including but not limited to infection, injury to nerves and vessels, steroid flare, increased blood sugar,  skin discoloration and fatty atrophy. Verbal consent was obtained. After sterilely prepping the lateral aspect of the right hip, the point of maximal tenderness over the greater trochanter is injected with 2 cc. of Kenalog (40 mg/ml), 2 cc of 1% Lidocaine, and 2 cc. of 0.5% Marcaine. The patient tolerated the procedure without complication. Will follow-up as above. No follow-up provider specified.      Nancy Fink MD  11/01/22

## 2022-11-08 ENCOUNTER — HOSPITAL ENCOUNTER (EMERGENCY)
Age: 57
Discharge: HOME | End: 2022-11-08
Attending: EMERGENCY MEDICINE
Payer: COMMERCIAL

## 2022-11-08 ENCOUNTER — HOSPITAL ENCOUNTER (EMERGENCY)
Dept: GENERAL RADIOLOGY | Age: 57
Discharge: HOME | End: 2022-11-11
Payer: COMMERCIAL

## 2022-11-08 VITALS
SYSTOLIC BLOOD PRESSURE: 131 MMHG | HEIGHT: 64 IN | OXYGEN SATURATION: 98 % | RESPIRATION RATE: 14 BRPM | WEIGHT: 180 LBS | DIASTOLIC BLOOD PRESSURE: 60 MMHG | HEART RATE: 46 BPM | BODY MASS INDEX: 30.73 KG/M2 | TEMPERATURE: 98.3 F

## 2022-11-08 DIAGNOSIS — R00.1 BRADYCARDIA: ICD-10-CM

## 2022-11-08 DIAGNOSIS — R07.9 CHEST PAIN, UNSPECIFIED TYPE: Primary | ICD-10-CM

## 2022-11-08 LAB
ALBUMIN SERPL-MCNC: 4.2 G/DL (ref 3.5–5)
ALBUMIN/GLOB SERPL: 1.1 {RATIO} (ref 0.4–1.6)
ALP SERPL-CCNC: 61 U/L (ref 50–136)
ALT SERPL-CCNC: 39 U/L (ref 12–65)
ANION GAP SERPL CALC-SCNC: 5 MMOL/L (ref 2–11)
AST SERPL-CCNC: 28 U/L (ref 15–37)
BILIRUB SERPL-MCNC: 0.4 MG/DL (ref 0.2–1.1)
BUN SERPL-MCNC: 31 MG/DL (ref 6–23)
CALCIUM SERPL-MCNC: 10 MG/DL (ref 8.3–10.4)
CHLORIDE SERPL-SCNC: 105 MMOL/L (ref 101–110)
CO2 SERPL-SCNC: 30 MMOL/L (ref 21–32)
CREAT SERPL-MCNC: 1 MG/DL (ref 0.6–1)
EKG ATRIAL RATE: 51 BPM
EKG ATRIAL RATE: 52 BPM
EKG DIAGNOSIS: NORMAL
EKG DIAGNOSIS: NORMAL
EKG P AXIS: -75 DEGREES
EKG P AXIS: 34 DEGREES
EKG P-R INTERVAL: 130 MS
EKG P-R INTERVAL: 150 MS
EKG Q-T INTERVAL: 428 MS
EKG Q-T INTERVAL: 436 MS
EKG QRS DURATION: 88 MS
EKG QRS DURATION: 88 MS
EKG QTC CALCULATION (BAZETT): 394 MS
EKG QTC CALCULATION (BAZETT): 405 MS
EKG R AXIS: -27 DEGREES
EKG R AXIS: -28 DEGREES
EKG T AXIS: -10 DEGREES
EKG T AXIS: -7 DEGREES
EKG VENTRICULAR RATE: 51 BPM
EKG VENTRICULAR RATE: 52 BPM
ERYTHROCYTE [DISTWIDTH] IN BLOOD BY AUTOMATED COUNT: 13.8 % (ref 11.9–14.6)
GLOBULIN SER CALC-MCNC: 3.7 G/DL (ref 2.8–4.5)
GLUCOSE SERPL-MCNC: 98 MG/DL (ref 65–100)
HCT VFR BLD AUTO: 41.4 % (ref 35.8–46.3)
HGB BLD-MCNC: 13.1 G/DL (ref 11.7–15.4)
MAGNESIUM SERPL-MCNC: 2.3 MG/DL (ref 1.8–2.4)
MCH RBC QN AUTO: 27.1 PG (ref 26.1–32.9)
MCHC RBC AUTO-ENTMCNC: 31.6 G/DL (ref 31.4–35)
MCV RBC AUTO: 85.5 FL (ref 82–102)
NRBC # BLD: 0 K/UL (ref 0–0.2)
PLATELET # BLD AUTO: 257 K/UL (ref 150–450)
PMV BLD AUTO: 9.9 FL (ref 9.4–12.3)
POTASSIUM SERPL-SCNC: 4.2 MMOL/L (ref 3.5–5.1)
PROT SERPL-MCNC: 7.9 G/DL (ref 6.3–8.2)
RBC # BLD AUTO: 4.84 M/UL (ref 4.05–5.2)
SODIUM SERPL-SCNC: 140 MMOL/L (ref 133–143)
TROPONIN I SERPL HS-MCNC: 4.5 PG/ML (ref 0–14)
TROPONIN I SERPL HS-MCNC: 5 PG/ML (ref 0–14)
WBC # BLD AUTO: 8.2 K/UL (ref 4.3–11.1)

## 2022-11-08 PROCEDURE — 93005 ELECTROCARDIOGRAM TRACING: CPT | Performed by: EMERGENCY MEDICINE

## 2022-11-08 PROCEDURE — 6370000000 HC RX 637 (ALT 250 FOR IP): Performed by: EMERGENCY MEDICINE

## 2022-11-08 PROCEDURE — 80053 COMPREHEN METABOLIC PANEL: CPT

## 2022-11-08 PROCEDURE — 36415 COLL VENOUS BLD VENIPUNCTURE: CPT

## 2022-11-08 PROCEDURE — 85027 COMPLETE CBC AUTOMATED: CPT

## 2022-11-08 PROCEDURE — 71045 X-RAY EXAM CHEST 1 VIEW: CPT

## 2022-11-08 PROCEDURE — 84484 ASSAY OF TROPONIN QUANT: CPT

## 2022-11-08 PROCEDURE — 83735 ASSAY OF MAGNESIUM: CPT

## 2022-11-08 PROCEDURE — 99285 EMERGENCY DEPT VISIT HI MDM: CPT

## 2022-11-08 RX ORDER — ASPIRIN 81 MG/1
324 TABLET, CHEWABLE ORAL
Status: DISCONTINUED | OUTPATIENT
Start: 2022-11-08 | End: 2022-11-08 | Stop reason: HOSPADM

## 2022-11-08 RX ORDER — NITROGLYCERIN 0.4 MG/1
0.4 TABLET SUBLINGUAL
Status: COMPLETED | OUTPATIENT
Start: 2022-11-08 | End: 2022-11-08

## 2022-11-08 RX ADMIN — NITROGLYCERIN 0.4 MG: 0.4 TABLET, ORALLY DISINTEGRATING SUBLINGUAL at 11:00

## 2022-11-08 ASSESSMENT — ENCOUNTER SYMPTOMS
CHEST TIGHTNESS: 1
SHORTNESS OF BREATH: 1

## 2022-11-08 ASSESSMENT — PAIN SCALES - GENERAL: PAINLEVEL_OUTOF10: 8

## 2022-11-08 ASSESSMENT — PAIN - FUNCTIONAL ASSESSMENT: PAIN_FUNCTIONAL_ASSESSMENT: 0-10

## 2022-11-08 NOTE — ED TRIAGE NOTES
Pt reports chest pain on and off for the past couple of months, pt states it worsened 2 weeks ago and has been more consistent since then. Pt states she has noticed increased fatigue with exercise over the last few months. Pt states pain is in center of chest and does not radiate anywhere else. Pt states she has a heart murmur but denies any other past cardiac history.

## 2022-11-08 NOTE — ED NOTES
I have reviewed discharge instructions with the patient. The patient verbalized understanding. Patient left ED via Discharge Method: ambulatory to Home with self. Opportunity for questions and clarification provided. Patient given 0 scripts. To continue your aftercare when you leave the hospital, you may receive an automated call from our care team to check in on how you are doing. This is a free service and part of our promise to provide the best care and service to meet your aftercare needs.  If you have questions, or wish to unsubscribe from this service please call [phone]. Thank you for Choosing our [facility] Emergency Department.           Tram Vyas RN  11/08/22 [id number]

## 2022-11-08 NOTE — ED PROVIDER NOTES
Emergency Department Provider Note                   PCP:                MUNDO Marvin MD               Age: 59 y.o. Sex: female     No diagnosis found. DISPOSITION          MDM  Number of Diagnoses or Management Options  Diagnosis management comments: Patient presenting with chest tightness and rhythm change. This may represent ischemia or underlying electrophysiological abnormality. Anticipate admission. Amount and/or Complexity of Data Reviewed  Clinical lab tests: ordered and reviewed  Tests in the radiology section of CPT®: ordered and reviewed  Review and summarize past medical records: yes  Discuss the patient with other providers: yes (Case discussed with Dr. Jesu Camacho on-call for cardiology. Recommends rechecking troponin at standard 2-hour interval.  If normal he will follow patient up in the office in 2 days.)  Independent visualization of images, tracings, or specimens: yes (EKG performed at 10:42 AM: Demonstrates unusual P axis and short IN interval with probable junctional rhythm and a rate of 51. No acute ST abnormalities are noted. Comparison of EKG of 5 March 2018 shows junctional rhythm replacing sinus rhythm.     Repeat EKG performed at 11:16 AM after nitroglycerin shows sinus bradycardia rate of 52, no acute ischemic changes.)    Risk of Complications, Morbidity, and/or Mortality  Presenting problems: high  Diagnostic procedures: moderate  Management options: moderate    Patient Progress  Patient progress: stable             Orders Placed This Encounter   Procedures    CBC    Comprehensive Metabolic Panel    Troponin    Cardiac Monitor    Pulse Oximetry    EKG 12 Lead    Saline lock IV        Medications   nitroGLYCERIN (NITROSTAT) SL tablet 0.4 mg (has no administration in time range)   aspirin chewable tablet 324 mg (has no administration in time range)       New Prescriptions    No medications on file        Terra Santos is a 59 y.o. female who presents to the Emergency Department with chief complaint of    Chief Complaint   Patient presents with    Chest Pain      Patient presents emerged department complaining of chest tightness that began about 630 this morning and has been present continuously since then. Discomfort seems to radiate slightly to the left shoulder. There are no associated nausea or shortness of breath. Patient has history of mitral valve prolapse. And also reports recurring symptoms of dyspnea on exertion and extreme fatigue with exertion and intermittent episodes of chest pain with exertion over the past month or so. She reports having had a stress test several years ago which was normal.  CAD risk factors include postmenopausal, obesity, hypercholesterolemia. The history is provided by the patient and medical records. Review of Systems   Constitutional:  Positive for fatigue. Negative for chills and fever. Respiratory:  Positive for chest tightness and shortness of breath. Cardiovascular:  Positive for chest pain. Negative for palpitations and leg swelling. All other systems reviewed and are negative. 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    Situational anxiety     Splenic artery aneurysm ([facility] 2015    2018--  1 cm, PCP follows        Past Surgical History:   Procedure Laterality Date    ADENOIDECTOMY      BREAST RECONSTRUCTION  2016    BREAST REDUCTION SURGERY  2014    BILATERAL BREAST REDUCTION performed by Lakeisha Everett MD at [address], [address]    x 2    CHOLECYSTECTOMY, LAPAROSCOPIC      COLONOSCOPY      GI      carcinoid tumor removal stomach    HEENT      jaw surgery x2    HEENT      BLEPHAROPTOSIS BILATERAL    HYSTERECTOMY (CERVIX STATUS UNKNOWN)      complete with bso    ORTHOPEDIC SURGERY Right     middle finger, ring surgery joint    OTHER SURGICAL HISTORY      Broken toe    SHOULDER ARTHROSCOPY Right 2013         UPPER GASTROINTESTINAL ENDOSCOPY  2015    WISDOM TOOTH EXTRACTION          Family History   Problem Relation Age of Onset    Hypertension Sister     Diabetes Sister     Heart Disease Sister     No Known Problems Brother     Arrhythmia Father     Hypertension Father     Arrhythmia Mother     Diabetes Father     Breast Cancer Neg Hx     Heart Disease Mother     Osteoarthritis Mother     COPD Mother     Stroke Mother     Heart Disease Father         Social History     Socioeconomic History    Marital status:      Spouse name: None    Number of children: None    Years of education: None    Highest education level: None   Tobacco Use    Smoking status: Former     Packs/day: 0.50     Types: Cigarettes     Quit date: 8/10/1980     Years since quittin.2    Smokeless tobacco: Never   Substance and Sexual Activity    Alcohol use: No    Drug use: No    Sexual activity: Yes     Partners: Male   Social History Narrative    Denies physical or sexual abuse         Atorvastatin, Penicillins, and Erythromycin     Previous Medications    ACETAMINOPHEN (TYLENOL) 500 MG TABLET    Take 1,000 mg by mouth every 6 hours as needed    ALOSETRON (LOTRONEX) 0.5 MG TABLET    Take 1 tablet by mouth as needed    CHOLINE FENOFIBRATE (TRILIPIX) 45 MG CPDR DELAYED RELEASE CAPSULE    Take 45 mg by mouth    COLESTIPOL (COLESTID) 1 G TABLET    Take 1 tablet by mouth as needed    ERGOCALCIFEROL (ERGOCALCIFEROL) 1.25 MG (46629 UT) CAPSULE    Take 50,000 Units by mouth once a week    ESCITALOPRAM (LEXAPRO) 10 MG TABLET    1 tablet daily    FAMOTIDINE (PEPCID) 20 MG TABLET        IBUPROFEN (ADVIL;MOTRIN) 200 MG TABLET    Take 400 mg by mouth every 6 hours as needed    LEVOTHYROXINE (SYNTHROID) 100 MCG TABLET    Take 100 mcg by mouth every morning (before breakfast)    LIDOCAINE 5 % CREA    Actual Prescription: Topical 5% Lidocaine and 5% Neurontin to apply to affected area up to 4 times/day as needed for pain    LORAZEPAM (ATIVAN) 0.5 MG TABLET    Take 0.5 mg by mouth 2 times daily as needed. LORAZEPAM (ATIVAN) 1 MG TABLET        OMEPRAZOLE (PRILOSEC) 40 MG DELAYED RELEASE CAPSULE    Take 40 mg by mouth daily    PREDNISONE 5 MG (48) TBPK    Take as directed per package instructions        Vitals signs and nursing note reviewed. Patient Vitals for the past 4 hrs:   Temp Pulse Resp BP SpO2   11/08/22 1042 98.3 °F (36.8 °C) 52 20 (!) 159/72 99 %          Physical Exam  Vitals and nursing note reviewed. Constitutional:       General: She is not in acute distress. Appearance: Normal appearance. She is not ill-appearing, toxic-appearing or diaphoretic. HENT:      Head: Normocephalic and atraumatic. Eyes:      Extraocular Movements: Extraocular movements intact. Conjunctiva/sclera: Conjunctivae normal.      Pupils: Pupils are equal, round, and reactive to light. Neck:      Comments: No JVD  Cardiovascular:      Rate and Rhythm: Regular rhythm. Bradycardia present. Pulses: Normal pulses. Heart sounds: Murmur heard. Abdominal:      General: There is no distension. Palpations: Abdomen is soft. Tenderness:  There is no abdominal tenderness. There is no right CVA tenderness, left CVA tenderness, guarding or rebound. Musculoskeletal:         General: Normal range of motion. Cervical back: Normal range of motion and neck supple. Skin:     General: Skin is warm and dry. Capillary Refill: Capillary refill takes less than 2 seconds. Neurological:      General: No focal deficit present. Mental Status: She is alert and oriented to person, place, and time. Mental status is at baseline. Psychiatric:         Mood and Affect: Mood normal.         Behavior: Behavior normal.         Thought Content: Thought content normal.        Procedures    No results found for any visits on 11/08/22. No orders to display                       Voice dictation software was used during the making of this note. This software is not perfect and grammatical and other typographical errors may be present. This note has not been completely proofread for errors.      Wen Ibarra, DO  11/08/22 [id number] [facility], DO  11/08/22 [facility],   11/08/22 [facility]

## 2022-11-10 ENCOUNTER — OFFICE VISIT (OUTPATIENT)
Dept: CARDIOLOGY CLINIC | Age: 57
End: 2022-11-10
Payer: COMMERCIAL

## 2022-11-10 VITALS
SYSTOLIC BLOOD PRESSURE: 128 MMHG | WEIGHT: 185 LBS | HEART RATE: 50 BPM | HEIGHT: 64 IN | BODY MASS INDEX: 31.58 KG/M2 | DIASTOLIC BLOOD PRESSURE: 72 MMHG

## 2022-11-10 DIAGNOSIS — D3A.00 CARCINOID TUMOR, UNSPECIFIED SITE, UNSPECIFIED WHETHER MALIGNANT: ICD-10-CM

## 2022-11-10 DIAGNOSIS — R07.2 PRECORDIAL PAIN: ICD-10-CM

## 2022-11-10 DIAGNOSIS — R07.2 PRECORDIAL CHEST PAIN: Primary | ICD-10-CM

## 2022-11-10 DIAGNOSIS — E03.9 ACQUIRED HYPOTHYROIDISM: ICD-10-CM

## 2022-11-10 PROCEDURE — 99205 OFFICE O/P NEW HI 60 MIN: CPT | Performed by: INTERNAL MEDICINE

## 2022-11-10 NOTE — PROGRESS NOTES
[address]  [address], [address]  PHONE: [phone]    SUBJECTIVE: Rosa Reece (1965) is a 59 y.o. female seen for a follow up visit regarding the following:   Specialty Problems          Cardiology Problems    Splenic artery [address] ([facility]        Precordial chest pain         Pt is here for follow up after her recent ER visit for chest pain, dyspnea on exertion, and fatigue. She states she has been having intermittent chest pain for several months however it worsened last week. She has also noticed progressively worsening dyspnea on exertion. Pt states she has not had any cardiac work up in at least 10-15 years. Past Medical History, Past Surgical History, Family history, Social History, and Medications were all reviewed with the patient today and updated as necessary. 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    Situational anxiety     Splenic artery aneurysm ([address] 2015    2018--  1 cm, PCP follows     Past Surgical History:   Procedure Laterality Date    ADENOIDECTOMY      BREAST RECONSTRUCTION  2016    BREAST REDUCTION SURGERY  2014    BILATERAL BREAST REDUCTION performed by Michael Chun MD at [address]  [address], [address]    x 2    CHOLECYSTECTOMY, LAPAROSCOPIC      COLONOSCOPY      GI      carcinoid tumor removal stomach    HEENT      jaw surgery x2    HEENT      BLEPHAROPTOSIS BILATERAL    HYSTERECTOMY (CERVIX STATUS UNKNOWN)      complete with bso    ORTHOPEDIC SURGERY Right     middle finger, ring surgery joint    OTHER SURGICAL HISTORY      Broken toe    SHOULDER ARTHROSCOPY Right 2013         UPPER GASTROINTESTINAL ENDOSCOPY  2015    WISDOM TOOTH EXTRACTION       Family History   Problem Relation Age of Onset    Hypertension Sister     Diabetes Sister     Heart Disease Sister     No Known Problems Brother     Arrhythmia Father     Hypertension Father     Arrhythmia Mother     Diabetes Father     Breast Cancer Neg Hx     Heart Disease Mother     Osteoarthritis Mother     COPD Mother     Stroke Mother     Heart Disease Father       Social History     Tobacco Use    Smoking status: Former     Packs/day: 0.50     Types: Cigarettes     Quit date: 8/10/1980     Years since quittin.2    Smokeless tobacco: Never   Substance Use Topics    Alcohol use: No       Current Outpatient Medications:     ergocalciferol (ERGOCALCIFEROL) 1.25 MG (82247 UT) capsule, Take 50,000 Units by mouth once a week, Disp: , Rfl:     famotidine (PEPCID) 20 MG tablet, daily, Disp: , Rfl:     omeprazole (PRILOSEC) 40 MG delayed release capsule, Take 40 mg by mouth daily, Disp: , Rfl:     LORazepam (ATIVAN) 1 MG tablet, as needed. , Disp: , Rfl:     acetaminophen (TYLENOL) 500 MG tablet, Take 1,000 mg by mouth every 6 hours as needed, Disp: , Rfl:     alosetron (LOTRONEX) 0.5 MG tablet, Take 1 tablet by mouth as needed, Disp: , Rfl:     choline fenofibrate (TRILIPIX) 45 MG CPDR delayed release capsule, Take 45 mg by mouth, Disp: , Rfl:     colestipol (COLESTID) 1 g tablet, Take 1 tablet by mouth as needed, Disp: , Rfl:     escitalopram (LEXAPRO) 10 MG tablet, 1 tablet daily, Disp: , Rfl:     ibuprofen (ADVIL;MOTRIN) 200 MG tablet, Take 400 mg by mouth every 6 hours as needed, Disp: , Rfl:     levothyroxine (SYNTHROID) 100 MCG tablet, Take 100 mcg by mouth every morning (before breakfast), Disp: , Rfl:     ROS:  Review of Systems - General ROS: negative for - chills, fatigue or fever  Hematological and Lymphatic ROS: negative for - bleeding problems, bruising or jaundice  Respiratory ROS: no cough, shortness of breath, or wheezing  Cardiovascular ROS: positive for - chest pain, dyspnea on exertion, and palpitations  Gastrointestinal ROS: no abdominal pain, change in bowel habits, or black or bloody stools  Neurological ROS: no TIA or stroke symptoms  All other systems negative.     Wt Readings from Last 3 Encounters:   11/10/22 185 lb (83.9 kg)   11/08/22 180 lb (81.6 kg)   06/07/22 187 lb (84.8 kg)     Temp Readings from Last 3 Encounters:   11/08/22 98.3 °F (36.8 °C) (Oral)     BP Readings from Last 3 Encounters:   11/10/22 128/72   11/08/22 131/60   06/07/22 120/70     Pulse Readings from Last 3 Encounters:   11/10/22 50   11/08/22 (!) 46   09/20/21 55       PHYSICAL EXAM:  /72   Pulse 50   Ht 5' 4\" (1.626 m)   Wt 185 lb (83.9 kg)   BMI 31.76 kg/m²   Physical Examination: General appearance - alert, well appearing, and in no distress  Mental status - alert, oriented to person, place, and time  Eyes - pupils equal and reactive, extraocular eye movements intact  Neck/lymph - supple, no significant adenopathy  Chest/lungs - clear to auscultation, no wheezes, rales or rhonchi, symmetric air entry  Heart/CV - bradycardic at 55, regular rhythm, normal S1, S2, no murmurs, rubs, clicks or gallops  Abdomen/GI - soft, nontender, nondistended, no masses or organomegaly  Musculoskeletal - no joint tenderness, deformity or swelling  Extremities - no pedal edema, no clubbing or cyanosis  Skin - normal coloration and turgor, no rashes, no suspicious skin lesions noted    EKG:  .         Medications reviewed and questions answered    Recent Results (from the past 672 hour(s))   EKG 12 Lead    Collection Time: 11/08/22 10:42 AM   Result Value Ref Range    Ventricular Rate 51 BPM    Atrial Rate 51 BPM    P-R Interval 130 ms    QRS Duration 88 ms    Q-T Interval 428 ms    QTc Calculation (Bazett) 394 ms    P Axis -75 degrees    R Axis -27 degrees    T Axis -7 degrees    Diagnosis       Unusual P axis and short NM, probable junctional rhythm   CBC    Collection Time: 11/08/22 10:45 AM   Result Value Ref Range    WBC 8.2 4.3 - 11.1 K/uL    RBC 4.84 4.05 - 5.2 M/uL    Hemoglobin 13.1 11.7 - 15.4 g/dL    Hematocrit 41.4 35.8 - 46.3 %    MCV 85.5 82.0 - 102.0 FL    MCH 27.1 26.1 - 32.9 PG    MCHC 31.6 31.4 - 35.0 g/dL    RDW 13.8 11.9 - 14.6 %    Platelets 558 262 - 992 K/uL    MPV 9.9 9.4 - 12.3 FL    nRBC 0.00 0.0 - 0.2 K/uL   Comprehensive Metabolic Panel    Collection Time: 11/08/22 10:45 AM   Result Value Ref Range    Sodium 140 133 - 143 mmol/L    Potassium 4.2 3.5 - 5.1 mmol/L    Chloride 105 101 - 110 mmol/L    CO2 30 21 - 32 mmol/L    Anion Gap 5 2 - 11 mmol/L    Glucose 98 65 - 100 mg/dL    BUN 31 (H) 6 - 23 MG/DL    Creatinine 1.00 0.6 - 1.0 MG/DL    Est, Glom Filt Rate >60 >60 ml/min/1.73m2    Calcium 10.0 8.3 - 10.4 MG/DL    Total Bilirubin 0.4 0.2 - 1.1 MG/DL    ALT 39 12 - 65 U/L    AST 28 15 - 37 U/L    Alk Phosphatase 61 50 - 136 U/L    Total Protein 7.9 6.3 - 8.2 g/dL    Albumin 4.2 3.5 - 5.0 g/dL    Globulin 3.7 2.8 - 4.5 g/dL    Albumin/Globulin Ratio 1.1 0.4 - 1.6     Troponin    Collection Time: 11/08/22 10:45 AM   Result Value Ref Range    Troponin, High Sensitivity 4.5 0 - 14 pg/mL   Magnesium    Collection Time: 11/08/22 10:45 AM   Result Value Ref Range Magnesium 2.3 1.8 - 2.4 mg/dL   EKG 12 Lead    Collection Time: 11/08/22 11:16 AM   Result Value Ref Range    Ventricular Rate 52 BPM    Atrial Rate 52 BPM    P-R Interval 150 ms    QRS Duration 88 ms    Q-T Interval 436 ms    QTc Calculation (Bazett) 405 ms    P Axis 34 degrees    R Axis -28 degrees    T Axis -10 degrees    Diagnosis Sinus bradycardia    Troponin    Collection Time: 11/08/22 12:48 PM   Result Value Ref Range    Troponin, High Sensitivity 5.0 0 - 14 pg/mL     Lab Results   Component Value Date/Time    CHOL 199 07/19/2022 08:12 AM    HDL 30 07/19/2022 08:12 AM       ASSESSMENT and PLAN  Problem List Items Addressed This Visit          Other    Carcinoid tumor    Precordial chest pain - Primary     Other Visit Diagnoses       Acquired hypothyroidism        Precordial pain               Precordial chest pain:  - Recent ER visit was unrevealing. Pt also with dyspnea on exertion. Chest pain worsening for the last several months. No recent cardiac work up. - Will get stress echo and cardiac calcium score. Pt is unsure if she'll be able to tolerate full exercise protocol but would like to try given that there might be a delay in a nuclear stress test given current world wide shortage. - If pt unable to tolerate exercise protocol will either attempt nuclear stress test or pursue further invasive testing. Bradycardia:  - Pt's HR 50 today, has been low. Review of extended records back into 2020 show that patient's HR has been consistently bradycardic in the 50s.      Full study stress echo and coronary calcium score this will assess for coronary disease obstructive ischemic heart disease valvular disease and also give us a look at her chronotropic reserve of note she does have a resting bradycardia of around 50 and has had no rate slowing medications    Continue meds as below    Current Outpatient Medications:     ergocalciferol (ERGOCALCIFEROL) 1.25 MG (66967 UT) capsule, Take 50,000 Units by mouth once a week, Disp: , Rfl:     famotidine (PEPCID) 20 MG tablet, daily, Disp: , Rfl:     omeprazole (PRILOSEC) 40 MG delayed release capsule, Take 40 mg by mouth daily, Disp: , Rfl:     LORazepam (ATIVAN) 1 MG tablet, as needed. , Disp: , Rfl:     acetaminophen (TYLENOL) 500 MG tablet, Take 1,000 mg by mouth every 6 hours as needed, Disp: , Rfl:     alosetron (LOTRONEX) 0.5 MG tablet, Take 1 tablet by mouth as needed, Disp: , Rfl:     choline fenofibrate (TRILIPIX) 45 MG CPDR delayed release capsule, Take 45 mg by mouth, Disp: , Rfl:     colestipol (COLESTID) 1 g tablet, Take 1 tablet by mouth as needed, Disp: , Rfl:     escitalopram (LEXAPRO) 10 MG tablet, 1 tablet daily, Disp: , Rfl:     ibuprofen (ADVIL;MOTRIN) 200 MG tablet, Take 400 mg by mouth every 6 hours as needed, Disp: , Rfl:     levothyroxine (SYNTHROID) 100 MCG tablet, Take 100 mcg by mouth every morning (before breakfast), Disp: , Rfl:     Elvira Mirelesrick  11/10/2022  1:04 PM    Pt is instructed to follow all appropriate cardiac risk factor recommendations and to be compliant with meds and testing. Instructed to follow up appropriately and seek urgent medical care if acute or unstable issues arise. Results of some tests may be viewed thru [address] but this does not substitute for follow up with MD. If follow up is not scheduled pt is instructed to call for follow up    I have personally seen and evaluated the patient and reviewed the students note and agree with the following assessment and plan and findings. I was present for and observed the key components of this note. Any appropriate additions or editing of the information have been done by me.     Lili Emerson MD, [facility] [place]  Cardiology

## 2022-11-17 ENCOUNTER — TELEPHONE (OUTPATIENT)
Dept: CARDIOLOGY CLINIC | Age: 57
End: 2022-11-17

## 2022-11-17 NOTE — TELEPHONE ENCOUNTER
Pt called to ask when the results of her stress echo will be ready. Pt would like to know, if these results come back \"normal,\" does she still have to do the calcium test? Pt would appreciate a call back.

## 2022-11-18 NOTE — TELEPHONE ENCOUNTER
Called pt back and informed her that per Dr. Mercedes Joya is normal. It is still recommended for her to have the calcium score test done since it checks something different than the strecho. Understanding voiced.

## 2022-11-22 ENCOUNTER — HOSPITAL ENCOUNTER (OUTPATIENT)
Dept: CT IMAGING | Age: 57
Discharge: HOME | End: 2022-11-25
Payer: SELF-PAY

## 2022-11-22 DIAGNOSIS — R07.2 PRECORDIAL PAIN: ICD-10-CM

## 2022-11-22 PROCEDURE — 75571 CT HRT W/O DYE W/CA TEST: CPT

## 2022-11-23 ENCOUNTER — TELEPHONE (OUTPATIENT)
Dept: CARDIOLOGY CLINIC | Age: 57
End: 2022-11-23

## 2022-11-23 NOTE — TELEPHONE ENCOUNTER
Yes she can be worked in earlier as long as she has had both her stress echo and her CTA.   I need to see the stress echo so I can see her response to exercise on the treadmill but we can see her possibly before the 1218 appointment you can put her in on any day that I am here in the office

## 2022-11-23 NOTE — TELEPHONE ENCOUNTER
When she had her calcium score yesterday they told her heart rate was 46 . Wants to see Dr Thiago Anderson sooner He had mentioned a pacemaker to her because her heart rate is so low.  Dont see him until 12/08  Please call

## 2022-11-23 NOTE — TELEPHONE ENCOUNTER
Pt.seen yesterday for CTA and HR was 46. She said she just \"feels awful\" . She is not on any rate lowering meds. She gets light headed a lot and fatigued frequently. She said  mentioned pacemaker but not for sure if she needs one. She was calling for sooner appt. than 12/8 but I told her no open slots unless he approves an earlier work in. I will send note to  to see if any advice.

## 2022-11-29 ENCOUNTER — OFFICE VISIT (OUTPATIENT)
Dept: CARDIOLOGY CLINIC | Age: 57
End: 2022-11-29
Payer: COMMERCIAL

## 2022-11-29 VITALS
HEART RATE: 60 BPM | BODY MASS INDEX: 31.41 KG/M2 | DIASTOLIC BLOOD PRESSURE: 70 MMHG | HEIGHT: 64 IN | SYSTOLIC BLOOD PRESSURE: 124 MMHG | WEIGHT: 184 LBS

## 2022-11-29 DIAGNOSIS — D3A.00 CARCINOID TUMOR, UNSPECIFIED SITE, UNSPECIFIED WHETHER MALIGNANT: ICD-10-CM

## 2022-11-29 DIAGNOSIS — E03.9 ACQUIRED HYPOTHYROIDISM: ICD-10-CM

## 2022-11-29 DIAGNOSIS — R07.2 PRECORDIAL CHEST PAIN: Primary | ICD-10-CM

## 2022-11-29 PROCEDURE — 99214 OFFICE O/P EST MOD 30 MIN: CPT | Performed by: INTERNAL MEDICINE

## 2022-11-29 NOTE — PROGRESS NOTES
[address] [place], [place]  [address], [address]  PHONE: [phone]    SUBJECTIVE: Chiquita Vallecillo (1965) is a 59 y.o. female seen for a follow up visit regarding the following:   Specialty Problems          Cardiology Problems    Splenic artery aneurysm ([facility] [address]        Precordial chest pain         Patient is here to follow-up on her stress echo and calcium score study. She is still having SOB on exertion and fatigue but the chest pain isn't as bad. She endorses heart flutters that have been happening more often. Recent stress echo was normal and calcium score was 30    Patient's stress echo has normal physiologic response on exercise with heart rate. She does have a resting bradycardia that is noted in multiple EKGs. It is difficult to prove chronotropic insufficiency but we can assess for this    Past Medical History, Past Surgical History, Family history, Social History, and Medications were all reviewed with the patient today and updated as necessary. 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 9)     BMI 30.55    Osteoarthritis fingers     PUD (peptic ulcer disease) last     Gastric ulcers/duodenal carcinoid, 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    Situational anxiety     Splenic artery aneurysm ([facility] [address] 2015    2018--  1 cm, PCP follows     Past Surgical History:   Procedure Laterality Date    ADENOIDECTOMY      BREAST RECONSTRUCTION  2016    BREAST REDUCTION SURGERY  2014    BILATERAL BREAST REDUCTION performed by Chiquis Lisa MD at [facility]  83, 80    x 2    CHOLECYSTECTOMY, LAPAROSCOPIC      COLONOSCOPY      GI      carcinoid tumor removal stomach    HEENT      jaw surgery x2    HEENT      BLEPHAROPTOSIS BILATERAL    HYSTERECTOMY (CERVIX STATUS UNKNOWN)      complete with bso    ORTHOPEDIC SURGERY Right     middle finger, ring surgery joint    OTHER SURGICAL HISTORY      Broken toe    SHOULDER ARTHROSCOPY Right 2013         UPPER GASTROINTESTINAL ENDOSCOPY  2015    WISDOM TOOTH EXTRACTION       Family History   Problem Relation Age of Onset    Hypertension Sister     Diabetes Sister     Heart Disease Sister     No Known Problems Brother     Arrhythmia Father     Hypertension Father     Arrhythmia Mother     Diabetes Father     Breast Cancer Neg Hx     Heart Disease Mother     Osteoarthritis Mother     COPD Mother     Stroke Mother     Heart Disease Father       Social History     Tobacco Use    Smoking status: Former     Packs/day: 0.50     Types: Cigarettes     Quit date: 8/10/1980     Years since quittin.3    Smokeless tobacco: Never   Substance Use Topics    Alcohol use: No       Current Outpatient Medications:     ergocalciferol (ERGOCALCIFEROL) 1.25 MG (60195 UT) capsule, Take 50,000 Units by mouth once a week, Disp: , Rfl:     famotidine (PEPCID) 20 MG tablet, daily, Disp: , Rfl:     omeprazole (PRILOSEC) 40 MG delayed release capsule, Take 40 mg by mouth daily, Disp: , Rfl:     LORazepam (ATIVAN) 1 MG tablet, as needed. , Disp: , Rfl:     acetaminophen (TYLENOL) 500 MG tablet, Take 1,000 mg by mouth every 6 hours as needed, Disp: , Rfl:     alosetron (LOTRONEX) 0.5 MG tablet, Take 1 tablet by mouth as needed, Disp: , Rfl:     choline fenofibrate (TRILIPIX) 45 MG CPDR delayed release capsule, Take 45 mg by mouth, Disp: , Rfl:     colestipol (COLESTID) 1 g tablet, Take 1 tablet by mouth as needed, Disp: , Rfl:     escitalopram (LEXAPRO) 10 MG tablet, 1 tablet daily, Disp: , Rfl:     ibuprofen (ADVIL;MOTRIN) 200 MG tablet, Take 400 mg by mouth every 6 hours as needed, Disp: , Rfl:     levothyroxine (SYNTHROID) 100 MCG tablet, Take 100 mcg by mouth every morning (before breakfast), Disp: , Rfl:     ROS:  Review of Systems - General ROS: negative for - chills, fatigue or fever  Hematological and Lymphatic ROS: negative for - bleeding problems, bruising or jaundice  Respiratory ROS: no cough, shortness of breath, or wheezing  Cardiovascular ROS: positive for - dyspnea on exertion and irregular heartbeat  Gastrointestinal ROS: no abdominal pain, change in bowel habits, or black or bloody stools  Neurological ROS: no TIA or stroke symptoms  All other systems negative.     Wt Readings from Last 3 Encounters:   11/29/22 184 lb (83.5 kg)   11/16/22 185 lb (83.9 kg)   11/10/22 185 lb (83.9 kg)     Temp Readings from Last 3 Encounters:   11/08/22 98.3 °F (36.8 °C) (Oral)     BP Readings from Last 3 Encounters:   11/29/22 124/70   11/16/22 132/80   11/10/22 128/72     Pulse Readings from Last 3 Encounters:   11/29/22 60   11/16/22 (!) 49   11/10/22 50       PHYSICAL EXAM:  /70   Pulse 60   Ht 5' 4\" (1.626 m)   Wt 184 lb (83.5 kg)   BMI 31.58 kg/m²   Physical Examination: General appearance - alert, well appearing, and in no distress  Mental status - alert, oriented to person, place, and time  Eyes - pupils equal and reactive, extraocular eye movements intact  Neck/lymph - supple, no significant adenopathy  Chest/lungs - clear to auscultation, no wheezes, rales or rhonchi, symmetric air entry  Heart/CV - normal rate, regular rhythm, normal S1, S2, no murmurs, rubs, clicks or gallops  Abdomen/GI - soft, nontender, nondistended, no masses or organomegaly  Musculoskeletal - no joint tenderness, deformity or swelling  Extremities - peripheral pulses normal, no pedal edema, no clubbing or cyanosis  Skin - normal coloration and turgor, no rashes, no suspicious skin lesions noted    EKG: normal EKG, normal sinus rhythm.          Medications reviewed and questions answered    Recent Results (from the past 672 hour(s))   EKG 12 Lead    Collection Time: 11/08/22 10:42 AM   Result Value Ref Range    Ventricular Rate 51 BPM    Atrial Rate 51 BPM    P-R Interval 130 ms    QRS Duration 88 ms    Q-T Interval 428 ms    QTc Calculation (Bazett) 394 ms    P Axis -75 degrees    R Axis -27 degrees    T Axis -7 degrees    Diagnosis       Unusual P axis and short NE, probable junctional rhythm   CBC    Collection Time: 11/08/22 10:45 AM   Result Value Ref Range    WBC 8.2 4.3 - 11.1 K/uL    RBC 4.84 4.05 - 5.2 M/uL    Hemoglobin 13.1 11.7 - 15.4 g/dL    Hematocrit 41.4 35.8 - 46.3 %    MCV 85.5 82.0 - 102.0 FL    MCH 27.1 26.1 - 32.9 PG    MCHC 31.6 31.4 - 35.0 g/dL    RDW 13.8 11.9 - 14.6 %    Platelets 425 268 - 097 K/uL    MPV 9.9 9.4 - 12.3 FL    nRBC 0.00 0.0 - 0.2 K/uL   Comprehensive Metabolic Panel    Collection Time: 11/08/22 10:45 AM   Result Value Ref Range    Sodium 140 133 - 143 mmol/L    Potassium 4.2 3.5 - 5.1 mmol/L    Chloride 105 101 - 110 mmol/L    CO2 30 21 - 32 mmol/L    Anion Gap 5 2 - 11 mmol/L    Glucose 98 65 - 100 mg/dL    BUN 31 (H) 6 - 23 MG/DL    Creatinine 1.00 0.6 - 1.0 MG/DL    Est, Glom Filt Rate >60 >60 ml/min/1.73m2    Calcium 10.0 8.3 - 10.4 MG/DL    Total Bilirubin 0.4 0.2 - 1.1 MG/DL    ALT 39 12 - 65 U/L    AST 28 15 - 37 U/L    Alk Phosphatase 61 50 - 136 U/L    Total Protein 7.9 6.3 - 8.2 g/dL    Albumin 4.2 3.5 - 5.0 g/dL    Globulin 3.7 2.8 - 4.5 g/dL    Albumin/Globulin Ratio 1.1 0.4 - 1.6 Troponin    Collection Time: 11/08/22 10:45 AM   Result Value Ref Range    Troponin, High Sensitivity 4.5 0 - 14 pg/mL   Magnesium    Collection Time: 11/08/22 10:45 AM   Result Value Ref Range    Magnesium 2.3 1.8 - 2.4 mg/dL   EKG 12 Lead    Collection Time: 11/08/22 11:16 AM   Result Value Ref Range    Ventricular Rate 52 BPM    Atrial Rate 52 BPM    P-R Interval 150 ms    QRS Duration 88 ms    Q-T Interval 436 ms    QTc Calculation (Bazett) 405 ms    P Axis 34 degrees    R Axis -28 degrees    T Axis -10 degrees    Diagnosis Sinus bradycardia    Troponin    Collection Time: 11/08/22 12:48 PM   Result Value Ref Range    Troponin, High Sensitivity 5.0 0 - 14 pg/mL   Stress echocardiogram (TTE) exercise with contrast, bubble, strain, and 3D PRN    Collection Time: 11/16/22  9:05 AM   Result Value Ref Range    Stress Target  bpm    LV EDV A2C 57 mL    LV EDV A4C 82 mL    LV ESV A2C 20 mL    LV ESV A4C 36 mL    IVSd 1.0 (A) 0.6 - 0.9 cm    LVIDd 4.6 3.9 - 5.3 cm    LVIDs 3.2 cm    LVOT Diameter 1.8 cm    LVOT Mean Gradient 4 mmHg    LVOT VTI 31.4 cm    LVOT Peak Velocity 1.4 m/s    LVOT Peak Gradient 7 mmHg    LVPWd 1.0 (A) 0.6 - 0.9 cm    LV E' Lateral Velocity 11 cm/s    LV E' Septal Velocity 7 cm/s    LV Ejection Fraction A2C 60 %    LV Ejection Fraction A4C 57 %    EF BP 58 55 - 100 %    LVOT Area 2.5 cm2    LVOT SV 79.9 ml    LA Minor Axis 4.3 cm    LA Major [place] 5.2 cm    LA Area 2C 15.4 cm2    LA Area 4C 14.0 cm2    LA Volume 2C 44 22 - 52 mL    LA Volume 4C 29 22 - 52 mL    LA Volume BP 40 22 - 52 mL    LA Diameter 3.5 cm    AV Mean Velocity 1.1 m/s    AV Mean Gradient 5 mmHg    AV VTI 38.1 cm    AV Peak Velocity 1.6 m/s    AV Peak Gradient 10 mmHg    AV Area by VTI 2.1 cm2    AV Area by Peak Velocity 2.2 cm2    Aortic Root 2.8 cm    MV E Wave Deceleration Time 187.0 ms    MV A Velocity 0.86 m/s    MV E Velocity 0.87 m/s    Est. RA Pressure 3 mmHg    RVIDd 2.8 cm    RV Basal Dimension 2.7 cm    RV Mid Dimension 2.2 cm    TAPSE 2.4 1.7 cm    TR Max Velocity 2.25 m/s    TR Peak Gradient 20 mmHg    MV E/A 1.01     LA/AO Root Ratio 1.25     AV Velocity Ratio 0.88     LVOT:AV VTI Index 0.82     LV Mass 2D 158.8 67 - 162 g    E/E' Lateral 7.91     E/E' Septal 12.43     RVSP 23 mmHg    Fractional Shortening 2D 30 28 - 44 %    E/E' Ratio (Averaged) 10.17     LV RWT Ratio 0.43     Body Surface Area 1.95 m2    LV ESV Index A4C 19 mL/m2    LV EDV Index A4C 43 mL/m2    LV ESV Index A2C 11 mL/m2    LV EDV Index A2C 30 mL/m2    LVIDd Index 2.43 cm/m2    LVIDs Index 1.69 cm/m2    LV Mass 2D Index 84.0 43 - 95 g/m2    LA Volume Index BP 21 16 - 34 ml/m2    LVOT Stroke Volume Index 42.3 mL/m2    LA Volume Index 2C 23 16 - 34 mL/m2    LA Volume Index 4C 15 (A) 16 - 34 mL/m2    LA Size Index 1.85 cm/m2    Ao Root Index 1.48 cm/m2    MICA/BSA VTI 1.1 cm2/m2    MICA/BSA Peak Velocity 1.2 cm2/m2    RA Major Axis 2.7 cm    RA Major Axis Index 1.43 cm/m2    Baseline Systolic  mmHg    Baseline Diastolic BP 80 mmHg    Baseline HR 49 bpm    Stress Systolic  mmHg    Stress Diastolic BP 82 mmHg    Stress Peak  bpm    Stress Rate Pressure Product 20,700 bpm*mmHg    Exercise Duration Time 7 min    Exercuse Duration Seconds 43 sec    Stress Estimated Workload 9.8 METS    Stress Percent HR Achieved 86 %    Angina Index 0     Left Ventricular Ejection Fraction 53     LVEF MODALITY ECHO      Lab Results   Component Value Date/Time    CHOL 199 07/19/2022 08:12 AM    HDL 30 07/19/2022 08:12 AM       ASSESSMENT and PLAN  Problem List Items Addressed This Visit          Other    Carcinoid tumor    Precordial chest pain - Primary     Other Visit Diagnoses       Acquired hypothyroidism               Patient has normal stress echo with normal physiologic response to exercise she has a calcium score of 30 which is minimally elevated this would not cause any symptoms.   We will place a monitor to see if there is possibility of chronotropic insufficiency causing symptoms    Continue meds as below    Current Outpatient Medications:     ergocalciferol (ERGOCALCIFEROL) 1.25 MG (75290 UT) capsule, Take 50,000 Units by mouth once a week, Disp: , Rfl:     famotidine (PEPCID) 20 MG tablet, daily, Disp: , Rfl:     omeprazole (PRILOSEC) 40 MG delayed release capsule, Take 40 mg by mouth daily, Disp: , Rfl:     LORazepam (ATIVAN) 1 MG tablet, as needed. , Disp: , Rfl:     acetaminophen (TYLENOL) 500 MG tablet, Take 1,000 mg by mouth every 6 hours as needed, Disp: , Rfl:     alosetron (LOTRONEX) 0.5 MG tablet, Take 1 tablet by mouth as needed, Disp: , Rfl:     choline fenofibrate (TRILIPIX) 45 MG CPDR delayed release capsule, Take 45 mg by mouth, Disp: , Rfl:     colestipol (COLESTID) 1 g tablet, Take 1 tablet by mouth as needed, Disp: , Rfl:     escitalopram (LEXAPRO) 10 MG tablet, 1 tablet daily, Disp: , Rfl:     ibuprofen (ADVIL;MOTRIN) 200 MG tablet, Take 400 mg by mouth every 6 hours as needed, Disp: , Rfl:     levothyroxine (SYNTHROID) 100 MCG tablet, Take 100 mcg by mouth every morning (before breakfast), Disp: , Rfl:     Jennifer Orona  11/29/2022  7:46 AM    Pt is instructed to follow all appropriate cardiac risk factor recommendations and to be compliant with meds and testing. Instructed to follow up appropriately and seek urgent medical care if acute or unstable issues arise. Results of some tests may be viewed thru [address] but this does not substitute for follow up with MD. If follow up is not scheduled pt is instructed to call for follow up    I have personally seen and evaluated the patient and reviewed the students note and agree with the following assessment and plan and findings. I was present for and observed the key components of this note. Any appropriate additions or editing of the information have been done by me.     Adan Lopez MD, [facility] [place]  Cardiology

## 2022-12-27 ENCOUNTER — TELEPHONE (OUTPATIENT)
Dept: CARDIOLOGY CLINIC | Age: 57
End: 2022-12-27

## 2022-12-27 NOTE — TELEPHONE ENCOUNTER
Holter monitor has not been processed by [employer] and we do not have any results yet. Pt has an appointment on 1.13 to discuss her results. Informed her that he will discuss it with her in detail at that time. Understanding voiced.

## 2022-12-28 DIAGNOSIS — J01.40 ACUTE PANSINUSITIS, RECURRENCE NOT SPECIFIED: Primary | ICD-10-CM

## 2022-12-28 RX ORDER — AZITHROMYCIN 250 MG/1
250 TABLET, FILM COATED ORAL SEE ADMIN INSTRUCTIONS
Qty: 6 TABLET | Refills: 0 | Status: SHIPPED | OUTPATIENT
Start: 2022-12-28 | End: 2023-01-02

## 2023-01-03 ENCOUNTER — TELEPHONE (OUTPATIENT)
Dept: CARDIOLOGY CLINIC | Age: 58
End: 2023-01-03

## 2023-01-03 NOTE — TELEPHONE ENCOUNTER
Per. Dr. Kole Acuna:    I reviewed her Holter monitor it looks normal there are no significant findings and most of the patient reported incidence actually were normal sinus rhythm with no PACs or PVCs so it appears as though she may be feeling noncardiac symptoms. Called pt and informed her of results. Pt voiced understanding.

## 2023-01-03 NOTE — TELEPHONE ENCOUNTER
Patient states she had to wear a heart monitor for 2 weeks and has turned it in already. She turned it in about 3 weeks ago and still hasnt heard anything. Please call and advise.

## 2023-01-06 ENCOUNTER — HOSPITAL ENCOUNTER (OUTPATIENT)
Dept: MAMMOGRAPHY | Age: 58
Discharge: HOME | End: 2023-01-06
Payer: COMMERCIAL

## 2023-01-06 DIAGNOSIS — Z12.31 VISIT FOR SCREENING MAMMOGRAM: ICD-10-CM

## 2023-01-06 PROCEDURE — 77067 SCR MAMMO BI INCL CAD: CPT

## 2023-01-18 ENCOUNTER — OFFICE VISIT (OUTPATIENT)
Dept: CARDIOLOGY CLINIC | Age: 58
End: 2023-01-18
Payer: COMMERCIAL

## 2023-01-18 VITALS
HEIGHT: 64 IN | HEART RATE: 64 BPM | BODY MASS INDEX: 32.44 KG/M2 | WEIGHT: 190 LBS | DIASTOLIC BLOOD PRESSURE: 60 MMHG | SYSTOLIC BLOOD PRESSURE: 110 MMHG

## 2023-01-18 DIAGNOSIS — R93.1 AGATSTON CAC SCORE, <100: ICD-10-CM

## 2023-01-18 DIAGNOSIS — I72.8 SPLENIC ARTERY ANEURYSM (HCC): ICD-10-CM

## 2023-01-18 DIAGNOSIS — I47.1 PSVT (PAROXYSMAL SUPRAVENTRICULAR TACHYCARDIA) (HCC): ICD-10-CM

## 2023-01-18 DIAGNOSIS — R07.2 PRECORDIAL CHEST PAIN: Primary | ICD-10-CM

## 2023-01-18 PROBLEM — I47.10 PSVT (PAROXYSMAL SUPRAVENTRICULAR TACHYCARDIA): Status: ACTIVE | Noted: 2023-01-18

## 2023-01-18 PROCEDURE — 99214 OFFICE O/P EST MOD 30 MIN: CPT | Performed by: INTERNAL MEDICINE

## 2023-01-18 ASSESSMENT — ENCOUNTER SYMPTOMS
WHEEZING: 0
DOUBLE VISION: 0
HEMATOCHEZIA: 0
EYE REDNESS: 0
HEMATEMESIS: 0
HOARSE VOICE: 0
ABDOMINAL PAIN: 0
STRIDOR: 0
HEMOPTYSIS: 0

## 2023-01-18 NOTE — PROGRESS NOTES
[facility] CARDIOLOGY  [address] [facility], [address]  PHONE: [phone]          23    NAME:  Corrinne Sloop  : 1965  MRN: [id number]         SUBJECTIVE:   Corrinne Sloop is a 59 y.o. female seen for a visit regarding the following:     Chief Complaint   Patient presents with    New Patient    Results           HPI:      Cardio problem list:  1. Palpitations/PSVT  -Holter monitor from 2022 with average heart rate at 60 bpm-sinus rhythm/sinus bradycardia. Rare PVCs and occasional PACs. 16 short runs of SVT with the longest being 16 beats and fastest at 183 bpm.    2.  Chest pain  -Stress echo from 2022 with no inducible ischemia with an EF at 50 to 55% and mild mitral regurgitation-good exercise capacity-achieved 9.8 METS. 3.  Elevated CT coronary calcium score from 2022 came back at 30  4. Borderline hyperlipidemia-intolerant to atorvastatin    -Previous patient of Dr. Ernesto Marquis    I saw Mr. Edwin Ayala is a pleasant 61-year-old woman in cardiovascular follow-up for palpitations, elevated CT coronary calcium score, history of chest pain and hyperlipidemia. When she last saw Dr. Ernesto Marquis about 2 months ago, he put her through a stress echo and a Holter monitor to work-up her symptoms. She is here in follow-up to discuss results of her testing. Elevated CT coronary calcium score-explained to her that this has significance with a coronary equivalent but in the absence of recurrent angina and a negative stress echo, these findings are reassuring. Hyperlipidemia-had myalgias with atorvastatin and is working on diet and exercise at this point and if he does not get much better, rosuvastatin is certainly something we can consider    No complaints of significant headaches or blurry vision with mild hypertension, no complaints of lower extremity edema orthopnea or PND.     Palpitations-occasional episodes of forceful beats in the chest and a few episodes of rapid heartbeats-no associated syncope.  Has had a few episodes of lightheadedness and dizziness when associated but very short-lived.      Past Medical History, Past Surgical History, Family history, Social History, and Medications were all reviewed with the patient today and updated as necessary.     Allergies   Allergen Reactions    Atorvastatin Other (See Comments)     Muscle cramping    Penicillins Hives    Erythromycin Nausea And Vomiting     Patient Active Problem List   Diagnosis    Splenic artery aneurysm (HCC)    History of endometrial cancer    Carcinoid tumor    Precordial chest pain    Benign carcinoid tumor of duodenum    Gastroesophageal reflux disease    Agatston CAC score, <100    PSVT (paroxysmal supraventricular tachycardia) (HCC)     Past Medical History:   Diagnosis Date    Asthma 1995    no inhalers needed since    Benign heart murmur     last echo 2012 LVEF 55-60% mild mitral regurgitation,4 mets    Cancer of duodenum (HCC) 2015    benign     Cervical dysplasia     Diabetes (HCC)     borderline- no meds    Fatty liver     GERD (gastroesophageal reflux disease)     seldom , tums or omeprazole prn    H/O wheezing     usually after a respiratory infection, uses prn inhaler    Heart palpitations     occassionally with no treatment     Hx gestational diabetes     no symptoms after childbirth    Hypercholesterolemia     Hypothyroidism     managed with medication     IBS (irritable bowel syndrome)     managed with diet and prn medication     Obesity (BMI 30.0-34.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    Situational anxiety     Splenic artery aneurysm (HCC) 06/30/2015    2018--  1 cm, PCP follows     Past Surgical History:   Procedure Laterality Date    ADENOIDECTOMY      BREAST RECONSTRUCTION  2016    BREAST REDUCTION SURGERY  5/7/2014    BILATERAL BREAST REDUCTION performed by Doug  Teetee Kaplan MD at [facility]  83, 80    x 2    CHOLECYSTECTOMY, LAPAROSCOPIC      COLONOSCOPY      GI      carcinoid tumor removal stomach    HEENT      jaw surgery x2    HEENT      BLEPHAROPTOSIS BILATERAL    HYSTERECTOMY (CERVIX STATUS UNKNOWN)      complete with bso    ORTHOPEDIC SURGERY Right     middle finger, ring surgery joint    OTHER SURGICAL HISTORY      Broken toe    SHOULDER ARTHROSCOPY Right 2013         UPPER GASTROINTESTINAL ENDOSCOPY  2015    WISDOM TOOTH EXTRACTION       Family History   Problem Relation Age of Onset    Hypertension Sister     Diabetes Sister     Heart Disease Sister     No Known Problems Brother     Arrhythmia Father     Hypertension Father     Arrhythmia Mother     Diabetes Father     Breast Cancer Neg Hx     Heart Disease Mother     Osteoarthritis Mother     COPD Mother     Stroke Mother     Heart Disease Father      Social History     Tobacco Use    Smoking status: Former     Packs/day: 0.50     Types: Cigarettes     Quit date: 8/10/1980     Years since quittin.4    Smokeless tobacco: Never   Substance Use Topics    Alcohol use: No     Current Outpatient Medications   Medication Sig Dispense Refill    ergocalciferol (ERGOCALCIFEROL) 1.25 MG (62483 UT) capsule Take 50,000 Units by mouth once a week      famotidine (PEPCID) 20 MG tablet daily      omeprazole (PRILOSEC) 40 MG delayed release capsule Take 40 mg by mouth daily      LORazepam (ATIVAN) 1 MG tablet as needed.       acetaminophen (TYLENOL) 500 MG tablet Take 1,000 mg by mouth every 6 hours as needed      alosetron (LOTRONEX) 0.5 MG tablet Take 1 tablet by mouth as needed      choline fenofibrate (TRILIPIX) 45 MG CPDR delayed release capsule Take 45 mg by mouth      colestipol (COLESTID) 1 g tablet Take 1 tablet by mouth as needed      escitalopram (LEXAPRO) 10 MG tablet 1 tablet daily      ibuprofen (ADVIL;MOTRIN) 200 MG tablet Take 400 mg by mouth every 6 hours as needed levothyroxine (SYNTHROID) 100 MCG tablet Take 100 mcg by mouth every morning (before breakfast)       No current facility-administered medications for this visit. Review of Systems   Constitutional: Negative for chills and fever. HENT:  Negative for ear discharge, hoarse voice and stridor. Eyes:  Negative for double vision and redness. Cardiovascular:  Positive for chest pain. Negative for cyanosis and syncope. Respiratory:  Negative for hemoptysis and wheezing. Endocrine: Negative for polydipsia and polyphagia. Hematologic/Lymphatic: Negative for adenopathy. Skin:  Negative for itching and rash. Musculoskeletal:  Negative for joint swelling and muscle weakness. Gastrointestinal:  Negative for abdominal pain, hematemesis and hematochezia. Genitourinary:  Negative for flank pain and nocturia. Neurological:  Negative for focal weakness and seizures. Psychiatric/Behavioral:  Negative for altered mental status and suicidal ideas. Allergic/Immunologic: Negative for hives. PHYSICAL EXAM:    /60   Pulse 64   Ht 5' 4\" (1.626 m)   Wt 190 lb (86.2 kg)   BMI 32.61 kg/m²      Physical Exam    General: Alert and oriented in no acute distress  HEENT: Head is normocephalic, atraumatic, pupils are equal bilaterally, throat appears to be clear  Neck: No significant jugular venous distention no cervical bruits  Cardiovascular: S1 and S2 heard, regular rate and rhythm, no significant murmurs rubs or gallops. Respiratory: Clear to auscultation bilaterally with no adventitious sounds, respirations are normal  Abdomen: Soft, nontender, nondistended, bowel sounds present. Extremities: No cyanosis clubbing or edema  Peripheral pulses: Bilateral radial artery pulses are palpated. Bilateral pedal pulses are well felt. Neuro: No facial droop and no gross focal motor deficits  Lymphatic: No significant cervical lymphadenopathy noted.   Musculoskeletal: No significant redness or swelling noted in all exposed joints. Skin: No significant rashes noted the of the exposed regions. Medical problems and test results were reviewed with the patient today. No results found for this or any previous visit (from the past 672 hour(s)). Lab Results   Component Value Date/Time    CHOL 199 07/19/2022 08:12 AM    HDL 30 07/19/2022 08:12 AM   ,hemoglobin, basic metabolic panel, No results found for: TSH, TSH2, TSH3 ,  Lab Results   Component Value Date/Time     11/08/2022 10:45 AM    K 4.2 11/08/2022 10:45 AM     11/08/2022 10:45 AM    CO2 30 11/08/2022 10:45 AM    BUN 31 11/08/2022 10:45 AM    GLOB 3.7 11/08/2022 10:45 AM    ALT 39 11/08/2022 10:45 AM    AST 28 11/08/2022 10:45 AM      Lab Results   Component Value Date    LDLCALC 113.4 (H) 07/19/2022      Lab Results   Component Value Date    CREATININE 1.00 11/08/2022 11/16/22    STRESS ECHOCARDIOGRAM EXERCISE (CONTRAST/BUBBLE PRN) 11/17/2022  5:25 PM, 11/17/2022 12:00 AM (Final)    Interpretation Summary    Study Impression: Normal stress echocardiogram. The study is negative for echocardiographic evidence of ischemia. Left Ventricle: Normal left ventricular systolic function with a visually estimated EF of 50 - 55%. Left ventricle size is normal. Normal wall thickness. Normal wall motion. Normal diastolic function. Average E/e' ratio is 10.17. Mitral Valve: Mild regurgitation. Stress Test: A Andrea protocol stress test was performed. Blood pressure demonstrated a normal response to stress. She achieved 9.8 METS-adequate exercise capacity. Signed by: Apolinar Arguello MD on 11/17/2022  5:25 PM, Signed by: Unknown Provider Result on 11/17/2022 12:00 AM     Lab Results   Component Value Date    HGB 13.1 11/08/2022      Lab Results   Component Value Date     11/08/2022        ASSESSMENT and PLAN      Precordial chest pain  -One-time episode-uncertain etiology. Negative stress echo for inducible ischemia.   Reviewed results with her in detail. Went over all the images and pictures. Splenic artery aneurysm ([address]  -Found incidentally the abdomen. Very important to keep blood pressures well controlled. Agatston CAC score, <100  --CT coronary calcium score was 30 from 2022-negative stress echo which is reassuring. Might need statin therapy if LDL is not much better. PSVT (paroxysmal supraventricular tachycardia) (HCC)   -Multiple short runs of PSVT noted on Holter monitor. Overall not concerning. If they do become significantly recurrent, we can refer to EP . Normal thyroid function, normal electrolytes    Overall Impression  -I reviewed the results of the stress test with her which showed no evidence of inducible ischemia and her echo showed preserved LV function with no significant valvular structural heart disease and no pericardial effusion. No obvious cardiac cause for angina at this point. She did have a mildly elevated CT coronary calcium score but with a negative stress echo, there is no indication for coronary angiography. She had adverse effects with atorvastatin in the past but I believe she should consider at least low-dose rosuvastatin for plaque stabilization of her lipids and are much better with recheck. Reviewed Holter monitor results with her which showed short runs of PSVT which is the likely cause for palpitations that she has from time to time. No reason to start her on beta-blocker or calcium channel blocker therapy as her baseline heart rates are already on the lower side and we might actually make her feel worse. No indication for SVT ablation with short runs. It is important to continue some amount of routine cardiovascular exercise and walking-30 minutes 5 days a week and to follow a low saturated fat diet, low in processed foods to bring down LDL cholesterol. If LDL is still above 100 when checked again, I would introduce low-dose rosuvastatin to bring down her numbers.     Return in about 6 months (around 7/18/2023) for psvt, hld. Thank you for allowing us to participate in the care of your patient. If you have any further questions, please do not hesitate to contact us.   Sincerely,        Ra Beaulieu MD   1/18/2023

## 2023-01-18 NOTE — PATIENT INSTRUCTIONS
Important to continue some amount of routine cardiovascular exercise and walking-30 minutes 5 days a week and to follow a low saturated fat diet, low in processed foods to bring down LDL cholesterol.  If LDL is still above 100 when checked again, I would introduce low-dose rosuvastatin to bring down your numbers.

## 2023-06-08 ENCOUNTER — OFFICE VISIT (OUTPATIENT)
Dept: ORTHOPEDIC SURGERY | Age: 58
End: 2023-06-08
Payer: COMMERCIAL

## 2023-06-08 VITALS — WEIGHT: 182 LBS | BODY MASS INDEX: 31.24 KG/M2

## 2023-06-08 DIAGNOSIS — M54.2 NECK PAIN: ICD-10-CM

## 2023-06-08 DIAGNOSIS — M43.16 SPONDYLOLISTHESIS OF LUMBAR REGION: ICD-10-CM

## 2023-06-08 DIAGNOSIS — M54.50 LOW BACK PAIN, UNSPECIFIED BACK PAIN LATERALITY, UNSPECIFIED CHRONICITY, UNSPECIFIED WHETHER SCIATICA PRESENT: Primary | ICD-10-CM

## 2023-06-08 DIAGNOSIS — M54.12 CERVICAL RADICULOPATHY: ICD-10-CM

## 2023-06-08 DIAGNOSIS — M47.812 CERVICAL FACET JOINT SYNDROME: ICD-10-CM

## 2023-06-08 DIAGNOSIS — M53.3 SACROILIAC JOINT DYSFUNCTION: ICD-10-CM

## 2023-06-08 PROCEDURE — 99204 OFFICE O/P NEW MOD 45 MIN: CPT | Performed by: PHYSICIAN ASSISTANT

## 2023-06-08 RX ORDER — METHYLPREDNISOLONE 4 MG/1
TABLET ORAL
Qty: 1 KIT | Refills: 0 | Status: SHIPPED | OUTPATIENT
Start: 2023-06-08

## 2023-06-08 RX ORDER — MAGNESIUM OXIDE 400 MG/1
400 TABLET ORAL DAILY
COMMUNITY
Start: 2023-04-13

## 2023-06-08 RX ORDER — RABEPRAZOLE SODIUM 20 MG/1
TABLET, DELAYED RELEASE ORAL
COMMUNITY
Start: 2023-05-31

## 2023-06-08 RX ORDER — ZOLPIDEM TARTRATE 10 MG/1
TABLET ORAL
COMMUNITY
Start: 2023-06-05

## 2023-06-08 NOTE — PATIENT INSTRUCTIONS
help.  Surgical procedures. Surgery for both DS and spondylolytic spondylolisthesis includes removing the pressure from the nerves and spinal fusion. Removing the pressure involves opening up the spinal canal. This procedure is called a laminectomy. Spinal fusion is essentially a \"welding\" process. The basic idea is to fuse together the painful vertebrae so that they heal into a single, solid bone. In spinal fusion, screws are often used to help stabilize the spine. Surgical recovery. The fusion process takes time. It may be several months before the bone is solid, although your comfort level will often improve much faster. Spondylitis (Spinal Arthritis) and Facet Joint Syndrome  At each level in our spine, there is a single disc  the bones (vertebrae) in front of the spinal canal, and a pair of joints called facet joints joining the bones together behind the spinal canal. As we age, the spinal discs and facet joints can wear out and degenerate. Disc degeneration is the term used to describe the wearing out of the discs. Spondylosis is the term used to describe degeneration and arthritis of the facet joints. Degeneration of the spine is a normal aging process, and in most cases spinal arthritis does not cause significant symptoms. However, for some people, arthritic facet joints can cause significant pain. Back or neck pain resulting from arthritic or inflamed facet joints is a condition called facet joint syndrome.         Symptoms  Back pain with radiation into hips and buttocks or neck pain with radiation into the shoulders  Natural History (Doing Nothing)  Not all arthritic facet joints cause symptoms   Back or neck pain may not be coming from the facet joints even if they are arthritic   Symptoms may resolve without treatment   Symptoms may be short-lived and infrequent   Rarely, patients develop more persistent and debilitating pain   Facet joints have very little ability to repair

## 2023-06-09 ENCOUNTER — TELEPHONE (OUTPATIENT)
Dept: ORTHOPEDIC SURGERY | Age: 58
End: 2023-06-09

## 2023-06-09 DIAGNOSIS — M50.10 HERNIATION OF CERVICAL INTERVERTEBRAL DISC WITH RADICULOPATHY: ICD-10-CM

## 2023-06-09 DIAGNOSIS — M48.02 CERVICAL SPINAL STENOSIS: Primary | ICD-10-CM

## 2023-06-09 NOTE — TELEPHONE ENCOUNTER
Name: Maryellen Wheeler  YOB: 1965  Gender: female  MRN: [id number]    CC: Results (Reviewed MRI cervical spine)   telephone       MRI Results (most recent): MRI Result (most recent):  MRI CERVICAL SPINE WO CONTRAST 06/09/2023    Narrative  STUDY: MRI CERVICAL SPINE WO CONTRAST [id number]    STUDY DATE: 6/9/2023 8:13 AM    HISTORY: Spondylosis without myelopathy or radiculopathy, cervical region;  Radiculopathy, cervical region    COMPARISON:  Radiographs performed June 8, 2023    TECHNIQUE: Multi-sequence, multi-planar MR images were obtained of the cervical  spine without the administration of intravenous contrast.    CONTRAST: None. FINDINGS:    VISUALIZED BRAIN: Within normal limits. SPINAL CORD: Normal in signal and morphology. MARROW: No diffuse or focal marrow signal abnormality. No fracture. ALIGNMENT: Minimal anterolisthesis of C3 on C4 and C4 on C5. SOFT TISSUES: Preserved vascular flow voids. No suspicious cervical adenopathy. No discrete thyroid nodule. Visualized lung apices are grossly clear. DISCS:Maintained. DEGENERATIVE:    C2-C3: No canal or foraminal narrowing. C3-C4: Shallow circumferential disc bulge. No spinal canal or neural foraminal  narrowing. Mild bilateral uncovertebral joint and facet joint arthropathy. Mild  right no significant left neural foraminal narrowing. C4-C5: Shallow circumferential disc bulge with tiny superimposed central disc  protrusion. No spinal canal stenosis. Mild bilateral uncovertebral joint and  facet joint arthropathy without significant neural foraminal narrowing. C5-C6: Shallow circumferential disc bulge with tiny superimposed central disc  protrusion. Mild effacement of the ventral thecal sac without significant spinal  canal stenosis. Mild bilateral uncovertebral joint and facet joint arthropathy  without significant neural foraminal narrowing.     C6-C7: Shallow cervical ventral disc bulge with superimposed

## 2023-06-09 NOTE — PROGRESS NOTES
A referral to pain management for CERVICAL TRENT injections was ordered. [facility].
An MRI of the Cervical spine has been ordered. A referral to PT has been ordered of the Cervical spine, Lumbar spine, and SI joints.
This Encounter   Procedures    XR LUMBAR SPINE (2-3 VIEWS)    XR CERVICAL SPINE (2-3 VIEWS)    MRI CERVICAL SPINE WO CONTRAST    Ambulatory referral to Physical Therapy              No follow-ups on file. Makenzie Evans PA-C  06/08/23      Elements of this note were created using speech recognition software. As such, errors of speech recognition may be present.

## 2023-07-07 ENCOUNTER — EVALUATION (OUTPATIENT)
Age: 58
End: 2023-07-07

## 2023-07-07 DIAGNOSIS — M54.2 NECK PAIN, CHRONIC: ICD-10-CM

## 2023-07-07 DIAGNOSIS — M54.12 CERVICAL RADICULOPATHY: ICD-10-CM

## 2023-07-07 DIAGNOSIS — M54.2 NECK PAIN: ICD-10-CM

## 2023-07-07 DIAGNOSIS — G89.29 NECK PAIN, CHRONIC: ICD-10-CM

## 2023-07-07 DIAGNOSIS — M53.3 SACROILIAC JOINT DYSFUNCTION: ICD-10-CM

## 2023-07-07 DIAGNOSIS — M54.50 LOW BACK PAIN, UNSPECIFIED BACK PAIN LATERALITY, UNSPECIFIED CHRONICITY, UNSPECIFIED WHETHER SCIATICA PRESENT: ICD-10-CM

## 2023-07-07 DIAGNOSIS — M43.6 NECK STIFFNESS: ICD-10-CM

## 2023-07-07 DIAGNOSIS — R53.1 WEAKNESS GENERALIZED: Primary | ICD-10-CM

## 2023-07-07 NOTE — PROGRESS NOTES
removal stomach    HEENT  1980's    jaw surgery x2    HEENT      BLEPHAROPTOSIS BILATERAL    HYSTERECTOMY (CERVIX STATUS UNKNOWN)  2008    complete with bso    ORTHOPEDIC SURGERY Right     middle finger, ring surgery joint    OTHER SURGICAL HISTORY      Broken toe    SHOULDER ARTHROSCOPY Right 2013         UPPER GASTROINTESTINAL ENDOSCOPY  4/2015    WISDOM TOOTH EXTRACTION       Medications: reviewed in chart   Allergies: Allergies   Allergen Reactions    Atorvastatin Other (See Comments)     Muscle cramping  Other reaction(s): Myalgia-Intolerance    Erythromycin Nausea And Vomiting     Other reaction(s): Nausea and/or vomiting-Intolerance    Penicillins Hives     Other reaction(s): Hives/Swelling-Allergy, Hives/Swelling-Allergy        SUBJECTIVE EXAMINATION     Chief complaints/history of injury: Patient is a 59 y.o. female with a PMH complicated as noted above. She presents to PT with c/o bilateral cervical pain and lumbar pain. Her primary concern is her cervical pain at present. Date symptoms began: ~6 months  Zulma  of condition: Chronic (continuous duration > 3 months)  Primary cause of current episode: Unspecified  How did symptoms start: Insidious onset  Describe current symptoms: Patient reports primary pain location is at her central cervicothoracic region, left > right. Describes as an ache and intermittent heaviness/paresthesia in the LUE. Patient reports increased frequency of headaches located superiorly    Received previous therapy? No; Patient reported past PT for LBP    Diagnostic exams (per chart review): per 6/9/2023 MRI Study  DEGENERATIVE:     C2-C3: No canal or foraminal narrowing. C3-C4: Shallow circumferential disc bulge. No spinal canal or neural foraminal narrowing. Mild bilateral uncovertebral joint and facet joint arthropathy. Mild right no significant left neural foraminal narrowing.      C4-C5: Shallow circumferential disc bulge with tiny superimposed central disc

## 2023-07-13 ENCOUNTER — TREATMENT (OUTPATIENT)
Age: 58
End: 2023-07-13

## 2023-07-13 DIAGNOSIS — G89.29 NECK PAIN, CHRONIC: ICD-10-CM

## 2023-07-13 DIAGNOSIS — M54.2 NECK PAIN, CHRONIC: ICD-10-CM

## 2023-07-13 DIAGNOSIS — M54.50 LOW BACK PAIN, UNSPECIFIED BACK PAIN LATERALITY, UNSPECIFIED CHRONICITY, UNSPECIFIED WHETHER SCIATICA PRESENT: ICD-10-CM

## 2023-07-13 DIAGNOSIS — R53.1 WEAKNESS GENERALIZED: Primary | ICD-10-CM

## 2023-07-13 DIAGNOSIS — M43.6 NECK STIFFNESS: ICD-10-CM

## 2023-07-13 DIAGNOSIS — M54.12 CERVICAL RADICULOPATHY: ICD-10-CM

## 2023-07-13 NOTE — PROGRESS NOTES
shoulder flexion strength to ? 4-/5 to improve upward reach strength to maneuver x-ray tube. Patient will report pain no greater than 4/10 to improve tolerance for puzzling. Patient will achieve an average score on the Patient-Specific Functional Scale ? 5/10 indicating improving functional mobility. Long term goals to be met by 9/3/2023  (8 weeks)  Patient will achieve cervical AROM to [facility] to improve safety when driving, comfort while puzzling, and sleep. Patient will report pain no greater than 2/10 to improve functional ADLs and IADLs. Patient will increase bilateral shoulder flexion strength to ? 4/5 to improve upward reach strength to maneuver x-ray tube. Patient will achieve an average score on the Patient-Specific Functional Scale ? 7/10 indicating improving functional mobility. Improve NDI to ? 6/50 allowing for improved Functional Deficit of ? 12%. Discharged from PT.    PLAN      Continue cervical spine manual therapy and progress with thoracic spine mobilizations    [facility]

## 2023-07-14 ENCOUNTER — TREATMENT (OUTPATIENT)
Age: 58
End: 2023-07-14

## 2023-07-14 DIAGNOSIS — M54.50 LOW BACK PAIN, UNSPECIFIED BACK PAIN LATERALITY, UNSPECIFIED CHRONICITY, UNSPECIFIED WHETHER SCIATICA PRESENT: ICD-10-CM

## 2023-07-14 DIAGNOSIS — R53.1 WEAKNESS GENERALIZED: Primary | ICD-10-CM

## 2023-07-14 DIAGNOSIS — M54.12 CERVICAL RADICULOPATHY: ICD-10-CM

## 2023-07-14 DIAGNOSIS — M54.2 NECK PAIN, CHRONIC: ICD-10-CM

## 2023-07-14 DIAGNOSIS — G89.29 NECK PAIN, CHRONIC: ICD-10-CM

## 2023-07-14 DIAGNOSIS — M43.6 NECK STIFFNESS: ICD-10-CM

## 2023-07-14 NOTE — PROGRESS NOTES
Went to bedside for 3min deceleration to 70s in the setting of tachysystole. Patient repositioned to left lateral. Patient started on 10L O2 non-rebreather. Decision made to administer terbutaline. Repeat exam 1/0/-3 @1100. Resolution to baseline 150, now with moderate variability. driving. Patient will increase bilateral shoulder flexion strength to ? 4-/5 to improve upward reach strength to maneuver x-ray tube. Patient will report pain no greater than 4/10 to improve tolerance for puzzling. Patient will achieve an average score on the Patient-Specific Functional Scale ? 5/10 indicating improving functional mobility. Long term goals to be met by 9/3/2023  (8 weeks)  Patient will achieve cervical AROM to [facility] to improve safety when driving, comfort while puzzling, and sleep. Patient will report pain no greater than 2/10 to improve functional ADLs and IADLs. Patient will increase bilateral shoulder flexion strength to ? 4/5 to improve upward reach strength to maneuver x-ray tube. Patient will achieve an average score on the Patient-Specific Functional Scale ? 7/10 indicating improving functional mobility. Improve NDI to ? 6/50 allowing for improved Functional Deficit of ? 12%. Discharged from PT.    PLAN      Continue cervical spine manual therapy and progress with thoracic spine mobilizations    aRdha

## 2023-07-31 ENCOUNTER — CLINICAL DOCUMENTATION (OUTPATIENT)
Age: 58
End: 2023-07-31

## 2023-08-02 ENCOUNTER — CLINICAL DOCUMENTATION (OUTPATIENT)
Age: 58
End: 2023-08-02

## 2023-08-02 NOTE — PROGRESS NOTES
[address] [facility]  [phone]   Physical Therapy Cancellation/No Show            Pt cancelled < 24 hours for their scheduled therapy appointment today.     Reason: Unknown  Communication:  Patient called and cancelled

## 2023-08-09 ENCOUNTER — TREATMENT (OUTPATIENT)
Age: 58
End: 2023-08-09
Payer: COMMERCIAL

## 2023-08-09 DIAGNOSIS — M54.12 CERVICAL RADICULOPATHY: ICD-10-CM

## 2023-08-09 DIAGNOSIS — R53.1 WEAKNESS GENERALIZED: Primary | ICD-10-CM

## 2023-08-09 DIAGNOSIS — M43.6 NECK STIFFNESS: ICD-10-CM

## 2023-08-09 DIAGNOSIS — G89.29 NECK PAIN, CHRONIC: ICD-10-CM

## 2023-08-09 DIAGNOSIS — M54.2 NECK PAIN, CHRONIC: ICD-10-CM

## 2023-08-09 PROCEDURE — 97110 THERAPEUTIC EXERCISES: CPT | Performed by: PHYSICAL THERAPIST

## 2023-08-09 PROCEDURE — 97140 MANUAL THERAPY 1/> REGIONS: CPT | Performed by: PHYSICAL THERAPIST

## 2023-08-09 PROCEDURE — 97012 MECHANICAL TRACTION THERAPY: CPT | Performed by: PHYSICAL THERAPIST

## 2023-08-09 NOTE — PROGRESS NOTES
[facility]  [address] [place] [phone]  Dept: [phone]      Physical Therapy Daily Note     Referring MD: Jennie Carr PA-C  Diagnosis:     ICD-10-CM    1. Weakness generalized  R53.1       2. Neck stiffness  M43.6       3. Neck pain, chronic  M54.2     G89.29       4. Cervical radiculopathy [M69.57 (ICD-10-CM)]  M54.12                Therapy precautions: No manipulations  Co-morbidities affecting plan of care: DM, Asthma, Obesity, OA    Total Timed Procedure Codes: 42 min, Total Time: 57 min  Time In: 08:45 AM  Time Out: 09:42 AM  Visit: 4   Modifier needed: No    PERTINENT MEDICAL HISTORY     Past medical and surgical history:   Past Medical History:   Diagnosis Date    Asthma 1995    no inhalers needed since    Benign heart murmur     last echo 2012 LVEF 55-60% mild mitral regurgitation,4 mets    Cancer of duodenum ([address]) 2015    benign     Cervical dysplasia     Diabetes ([address])     borderline- no meds    Fatty liver     GERD (gastroesophageal reflux disease)     seldom , tums or omeprazole prn    H/O wheezing     usually after a respiratory infection, uses prn inhaler    Heart palpitations     occassionally with no treatment     Hx gestational diabetes     no symptoms after childbirth    Hypercholesterolemia     Hypothyroidism     managed with medication     IBS (irritable bowel syndrome)     managed with diet and prn medication     Obesity (BMI 30.0-34. 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    Situational anxiety     Splenic artery aneurysm ([address]) 06/30/2015    2018--  1 cm, PCP follows      Past Surgical History:   Procedure Laterality Date    ADENOIDECTOMY      BREAST RECONSTRUCTION  2016    BREAST REDUCTION SURGERY  5/7/2014    BILATERAL BREAST REDUCTION performed by Ammon Saucedo MD at [facility]  83, 84    x 2    CHOLECYSTECTOMY,

## 2023-08-11 ENCOUNTER — TREATMENT (OUTPATIENT)
Age: 58
End: 2023-08-11

## 2023-08-11 DIAGNOSIS — M43.6 NECK STIFFNESS: ICD-10-CM

## 2023-08-11 DIAGNOSIS — G89.29 NECK PAIN, CHRONIC: ICD-10-CM

## 2023-08-11 DIAGNOSIS — R53.1 WEAKNESS GENERALIZED: Primary | ICD-10-CM

## 2023-08-11 DIAGNOSIS — M54.12 CERVICAL RADICULOPATHY: ICD-10-CM

## 2023-08-11 DIAGNOSIS — M54.2 NECK PAIN, CHRONIC: ICD-10-CM

## 2023-08-11 NOTE — PROGRESS NOTES
weeks)  Patient will achieve cervical AROM to [facility] to improve safety when driving, comfort while puzzling, and sleep. Patient will report pain no greater than 2/10 to improve functional ADLs and IADLs. Patient will increase bilateral shoulder flexion strength to ? 4/5 to improve upward reach strength to maneuver x-ray tube. Patient will achieve an average score on the Patient-Specific Functional Scale ? 7/10 indicating improving functional mobility. Improve NDI to ? 6/50 allowing for improved Functional Deficit of ? 12%. Discharged from PT. PLAN      Re-eval    [facility]  Access Code: [email or web address]  URL: [email or web address]  Date: 08/09/2023  Prepared by: Chris Ramirez DPT    Exercises  - Seated Diaphragmatic Breathing  - 8 x daily - 7 x weekly - 1 sets - 5 reps  - Shoulder External Rotation and Scapular Retraction  - 8 x daily - 7 x weekly - 1 sets - 5 reps  - Seated Thoracic Self-Mobilization  - 3 x daily - 7 x weekly - 1 sets - 10 reps - 5 seconds hold  - 3 Finger Cervical Rotation  - 3 x daily - 7 x weekly - 1-2 sets - 5-8 reps  - Sidelying Thoracic and Shoulder Rotation  - 2 x daily - 7 x weekly - 1 sets - 5 reps - 2-3 seconds hold  - Cervical Retraction with Resistance  - 2 x daily - 7 x weekly - 1 sets - 10 reps - 2-3 seconds hold  - Supine Deep Neck Flexor Training  - 2 x daily - 7 x weekly - 1 sets - 10 reps - 10 seconds hold  - Supported Deep Neck Flexor Training  - 2 x daily - 7 x weekly - 2 sets - 5 reps      ---------------      Access Code: [email or web address]  URL: [email or web address]  Date: 07/09/2023  Prepared by: Chris Ramirez DPT     Exercises  - Seated Diaphragmatic Breathing  - 8 x daily - 7 x weekly - 1 sets - 5 reps  - Shoulder External Rotation and Scapular Retraction  - 8 x daily - 7 x weekly - 1 sets - 5 reps  - Supine Thoracic Mobilization Towel Roll Vertical  - 2 x daily - 7 x weekly - 3-5 minutes duration  - Supine Thoracic Mobilization Towel Roll Vertical

## 2023-08-15 ENCOUNTER — TREATMENT (OUTPATIENT)
Age: 58
End: 2023-08-15

## 2023-08-15 DIAGNOSIS — M43.6 NECK STIFFNESS: ICD-10-CM

## 2023-08-15 DIAGNOSIS — R53.1 WEAKNESS GENERALIZED: Primary | ICD-10-CM

## 2023-08-15 DIAGNOSIS — M54.2 NECK PAIN, CHRONIC: ICD-10-CM

## 2023-08-15 DIAGNOSIS — M54.12 CERVICAL RADICULOPATHY: ICD-10-CM

## 2023-08-15 DIAGNOSIS — G89.29 NECK PAIN, CHRONIC: ICD-10-CM

## 2023-08-15 NOTE — PROGRESS NOTES
[facility] Rudi Ivy  [address] [facility] [address]  Dept: [phone]      Physical Therapy Daily Note     Referring MD: Padmaja Muse PA-C  Diagnosis:     ICD-10-CM    1. Weakness generalized  R53.1       2. Neck pain, chronic  M54.2     G89.29       3. Neck stiffness  M43.6       4. Cervical radiculopathy [Z12.38 (ICD-10-CM)]  M54.12                Therapy precautions: No manipulations  Co-morbidities affecting plan of care: DM, Asthma, Obesity, OA    Total Timed Procedure Codes: 45 min, Total Time: 60 min  Time In: 04:00 PM  Time Out: 05:00 PM  Visit: 6   Modifier needed: No    PERTINENT MEDICAL HISTORY     Past medical and surgical history:   Past Medical History:   Diagnosis Date    Asthma 1995    no inhalers needed since    Benign heart murmur     last echo 2012 LVEF 55-60% mild mitral regurgitation,4 mets    Cancer of duodenum ([address]) 2015    benign     Cervical dysplasia     Diabetes ([address])     borderline- no meds    Fatty liver     GERD (gastroesophageal reflux disease)     seldom , tums or omeprazole prn    H/O wheezing     usually after a respiratory infection, uses prn inhaler    Heart palpitations     occassionally with no treatment     Hx gestational diabetes     no symptoms after childbirth    Hypercholesterolemia     Hypothyroidism     managed with medication     IBS (irritable bowel syndrome)     managed with diet and prn medication     Obesity (BMI 30.0-34. 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

## 2023-08-17 ENCOUNTER — CLINICAL DOCUMENTATION (OUTPATIENT)
Age: 58
End: 2023-08-17

## 2023-08-17 NOTE — PROGRESS NOTES
[address] [facility]  [phone]   Physical Therapy Cancellation/No Show            Pt cancelled < 24 hours for their scheduled therapy appointment today.     Reason: Unknown  Communication:  Patient called and cancelled

## 2023-08-21 ENCOUNTER — TREATMENT (OUTPATIENT)
Age: 58
End: 2023-08-21

## 2023-08-21 DIAGNOSIS — R53.1 WEAKNESS GENERALIZED: Primary | ICD-10-CM

## 2023-08-21 DIAGNOSIS — M43.6 NECK STIFFNESS: ICD-10-CM

## 2023-08-21 DIAGNOSIS — M54.2 NECK PAIN, CHRONIC: ICD-10-CM

## 2023-08-21 DIAGNOSIS — M54.12 CERVICAL RADICULOPATHY: ICD-10-CM

## 2023-08-21 DIAGNOSIS — G89.29 NECK PAIN, CHRONIC: ICD-10-CM

## 2023-08-21 NOTE — PROGRESS NOTES
[facility] Charissa Delgado  [address] [facility] [address]  Dept: [phone]      Physical Therapy Daily Note     Referring MD: Kiah Louie PA-C  Diagnosis:     ICD-10-CM    1. Weakness generalized  R53.1       2. Neck pain, chronic  M54.2     G89.29       3. Neck stiffness  M43.6       4. Cervical radiculopathy [A80.52 (ICD-10-CM)]  M54.12                Therapy precautions: No manipulations  Co-morbidities affecting plan of care: DM, Asthma, Obesity, OA    Total Timed Procedure Codes: 37 min, Total Time: 60 min  Time In: 04:00 PM  Time Out: 05:00 PM  Visit: 7   Modifier needed: No    PERTINENT MEDICAL HISTORY     Past medical and surgical history:   Past Medical History:   Diagnosis Date    Asthma 1995    no inhalers needed since    Benign heart murmur     last echo 2012 LVEF 55-60% mild mitral regurgitation,4 mets    Cancer of duodenum ([address]) 2015    benign     Cervical dysplasia     Diabetes ([address])     borderline- no meds    Fatty liver     GERD (gastroesophageal reflux disease)     seldom , tums or omeprazole prn    H/O wheezing     usually after a respiratory infection, uses prn inhaler    Heart palpitations     occassionally with no treatment     Hx gestational diabetes     no symptoms after childbirth    Hypercholesterolemia     Hypothyroidism     managed with medication     IBS (irritable bowel syndrome)     managed with diet and prn medication     Obesity (BMI 30.0-34. 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

## 2023-08-25 ENCOUNTER — CLINICAL DOCUMENTATION (OUTPATIENT)
Age: 58
End: 2023-08-25

## 2023-08-25 NOTE — PROGRESS NOTES
[address] [facility]  [phone]   Physical Therapy Cancellation/No Show            Pt cancelled < 24 hours for their scheduled therapy appointment today.     Reason:  Son coming in town  Communication:  Patient called and cancelled

## 2023-08-31 ENCOUNTER — OFFICE VISIT (OUTPATIENT)
Dept: ORTHOPEDIC SURGERY | Age: 58
End: 2023-08-31
Payer: COMMERCIAL

## 2023-08-31 DIAGNOSIS — M48.02 CERVICAL SPINAL STENOSIS: Primary | ICD-10-CM

## 2023-08-31 DIAGNOSIS — M47.812 CERVICAL FACET JOINT SYNDROME: ICD-10-CM

## 2023-08-31 DIAGNOSIS — M50.10 HERNIATION OF CERVICAL INTERVERTEBRAL DISC WITH RADICULOPATHY: ICD-10-CM

## 2023-08-31 PROCEDURE — 99214 OFFICE O/P EST MOD 30 MIN: CPT | Performed by: PHYSICIAN ASSISTANT

## 2023-08-31 NOTE — PROGRESS NOTES
above. This is most pronounced at  C6-C7, where there is a central disc extrusion with cranial migration of disc  material, slightly right of midline, with mild spinal canal stenosis/flattening  of the ventral cord. I independently reviewed the MRI of the cervical spine. There is some facet arthropathy more on the left at C3-4 there is actually a posterior synovial cyst of the left C3-4 facet. And there is C6-7 central disc extrusion with cranial migration just right of the midline with mild to moderate stenosis. Assessment/Plan:       Diagnosis Orders   1. Cervical spinal stenosis        2. Cervical facet joint syndrome        3. Herniation of cervical intervertebral disc with radiculopathy            She has symptoms of cervical stenosis from the C6-7 disc protrusion mostly parascapular pain. She is exhausted physical therapy. I am recommending cervical epidural steroid injection. She also has some facet agenic pain on the left which is consistent with the left C3-4 facet arthropathy. We discussed with the cervical epidural first for the disc protrusion in her stenotic pain however we may need to consider cervical facet in the future. 4 This is a chronic illness/condition with exacerbation and progression    No orders of the defined types were placed in this encounter. No orders of the defined types were placed in this encounter. Return for after cervical injection with ANTONY. Luiza Kearns PA-C  08/31/23      Elements of this note were created using speech recognition software. As such, errors of speech recognition may be present.

## 2023-11-06 DIAGNOSIS — M19.041 ARTHRITIS OF BOTH HANDS: ICD-10-CM

## 2023-11-06 DIAGNOSIS — Z82.61 FAMILY HISTORY OF RHEUMATOID ARTHRITIS: Primary | ICD-10-CM

## 2023-11-06 DIAGNOSIS — M19.042 ARTHRITIS OF BOTH HANDS: ICD-10-CM

## 2023-11-10 ENCOUNTER — CLINICAL DOCUMENTATION (OUTPATIENT)
Dept: ORTHOPEDIC SURGERY | Age: 58
End: 2023-11-10

## 2023-12-19 ENCOUNTER — OFFICE VISIT (OUTPATIENT)
Dept: ORTHOPEDIC SURGERY | Age: 58
End: 2023-12-19

## 2023-12-19 DIAGNOSIS — M25.552 LEFT HIP PAIN: ICD-10-CM

## 2023-12-19 DIAGNOSIS — M54.50 LOW BACK PAIN, UNSPECIFIED BACK PAIN LATERALITY, UNSPECIFIED CHRONICITY, UNSPECIFIED WHETHER SCIATICA PRESENT: Primary | ICD-10-CM

## 2024-01-02 NOTE — PROGRESS NOTES
Name: Lora Dubon  YOB: 1965  Gender: female  MRN: [id number]    CC: Back Problem       Radiographic Studies:     Independent interpretation of AP, lateral and spot views of the lumbar spine and AP lateral left hip:  12/19/23      AP of the lumbar spine shows lower lumbar facet arthropathy.  No scoliosis.  Sagittal view of the lumbar spine Level of a slight anterior listhesis.  At this level there is mild to moderate stenosis.  L3-4 is some mild right lateral recess stenosis.  L4-5 there is moderate right lateral recess stenosis but no significant central stenosis.  Reveals subtle anterior listhesis of L4-5.  AP of the pelvis is level no significant DJD of the hips.  No hip fractures or pathology.  X-ray impression: Lumbar spondylolisthesis L4-5 and lower lumbar facet arthropathy  No acute abnormality left hip

## 2024-01-08 DIAGNOSIS — M79.641 BILATERAL HAND PAIN: Primary | ICD-10-CM

## 2024-01-08 DIAGNOSIS — M79.642 BILATERAL HAND PAIN: Primary | ICD-10-CM

## 2024-01-08 RX ORDER — METHYLPREDNISOLONE 4 MG/1
TABLET ORAL
Qty: 1 KIT | Refills: 0 | Status: SHIPPED | OUTPATIENT
Start: 2024-01-08

## 2024-01-16 ENCOUNTER — OFFICE VISIT (OUTPATIENT)
Dept: VASCULAR SURGERY | Age: 59
End: 2024-01-16
Payer: OTHER GOVERNMENT

## 2024-01-16 VITALS
HEIGHT: 64 IN | OXYGEN SATURATION: 97 % | WEIGHT: 190 LBS | DIASTOLIC BLOOD PRESSURE: 84 MMHG | SYSTOLIC BLOOD PRESSURE: 131 MMHG | BODY MASS INDEX: 32.44 KG/M2 | HEART RATE: 60 BPM

## 2024-01-16 DIAGNOSIS — I72.8 SPLENIC ARTERY ANEURYSM (HCC): Primary | ICD-10-CM

## 2024-01-16 PROCEDURE — 99213 OFFICE O/P EST LOW 20 MIN: CPT | Performed by: NURSE PRACTITIONER

## 2024-01-17 NOTE — PROGRESS NOTES
DATE OF VISIT: 2024      [facility]  Lora Dubon is a 58 y.o. female is here in follow up for 2 year mesenteric duplex for a known splenic artery aneurysm. She denies any new abdominal pain or back pain. She has undergone a hysterectomy. She is taking Trilipix.         MEDICAL HISTORY:   Past Medical History:   Diagnosis Date    Asthma     no inhalers needed since    Benign heart murmur     last echo  LVEF 55-60% mild mitral regurgitation,4 mets    Cancer of duodenum (HCC)     benign     Cervical dysplasia     Diabetes (HCC)     borderline- no meds    Fatty liver     GERD (gastroesophageal reflux disease)     seldom , tums or omeprazole prn    H/O wheezing     usually after a respiratory infection, uses prn inhaler    Heart palpitations     occassionally with no treatment     Hx gestational diabetes     no symptoms after childbirth    Hypercholesterolemia     Hypothyroidism     managed with medication     IBS (irritable bowel syndrome)     managed with diet and prn medication     Obesity (BMI 30.0-34.9)     BMI 30.55    Osteoarthritis     fingers     PUD (peptic ulcer disease) last     Gastric ulcers/duodenal carcinoid, 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 MAIN OR     SECTION  83, 86    x 2    CHOLECYSTECTOMY, LAPAROSCOPIC      COLONOSCOPY      GI      carcinoid tumor removal stomach    HEENT      jaw surgery x2    HEENT      BLEPHAROPTOSIS BILATERAL    HYSTERECTOMY (CERVIX STATUS UNKNOWN)      complete with bso    ORTHOPEDIC SURGERY Right     middle finger, ring surgery joint    OTHER SURGICAL HISTORY      Broken toe    SHOULDER ARTHROSCOPY Right

## 2024-02-07 ENCOUNTER — TRANSCRIBE ORDERS (OUTPATIENT)
Facility: HOSPITAL | Age: 59
End: 2024-02-07

## 2024-02-07 DIAGNOSIS — Z12.31 VISIT FOR SCREENING MAMMOGRAM: Primary | ICD-10-CM

## 2024-02-22 ENCOUNTER — OFFICE VISIT (OUTPATIENT)
Dept: OBGYN CLINIC | Age: 59
End: 2024-02-22
Payer: OTHER GOVERNMENT

## 2024-02-22 VITALS
SYSTOLIC BLOOD PRESSURE: 122 MMHG | DIASTOLIC BLOOD PRESSURE: 68 MMHG | HEIGHT: 64 IN | WEIGHT: 186 LBS | BODY MASS INDEX: 31.76 KG/M2

## 2024-02-22 DIAGNOSIS — N95.2 VAGINAL ATROPHY: ICD-10-CM

## 2024-02-22 DIAGNOSIS — Z01.419 WELL WOMAN EXAM: Primary | ICD-10-CM

## 2024-02-22 DIAGNOSIS — Z12.72 VAGINAL SMEAR: ICD-10-CM

## 2024-02-22 PROCEDURE — 99396 PREV VISIT EST AGE 40-64: CPT | Performed by: OBSTETRICS & GYNECOLOGY

## 2024-02-22 PROCEDURE — 99459 PELVIC EXAMINATION: CPT | Performed by: OBSTETRICS & GYNECOLOGY

## 2024-02-22 RX ORDER — LEVOTHYROXINE SODIUM 112 UG/1
TABLET ORAL
COMMUNITY
Start: 2024-02-14

## 2024-02-22 RX ORDER — BUTALBITAL, ACETAMINOPHEN AND CAFFEINE 50; 325; 40 MG/1; MG/1; MG/1
1 TABLET ORAL EVERY 8 HOURS PRN
COMMUNITY
Start: 2023-12-01

## 2024-02-22 RX ORDER — ESTRADIOL 0.1 MG/G
CREAM VAGINAL
Qty: 42.5 G | Refills: 5 | Status: SHIPPED | OUTPATIENT
Start: 2024-02-22

## 2024-02-22 NOTE — PROGRESS NOTES
Female Genitourinary     External Genitalia   Vulva: - Normal. Perineum - Normal. Bartholin's Gland - Bilateral - Normal. Clitoris - Normal.   Introitus: Characteristics - Normal.   Urethra: Characteristics - Normal.     Speculum & Bimanual   Vagina: Vaginal Mucosa - Normal  Vaginal Wall: - Normal.   Vaginal Lesions - None.   Cervix: Characteristics - Surgically absent  Uterus: Characteristics - Surgically absent  Adnexa: - Normal.   Bladder - Normal.     Peripheral Vascular   Normal    Neuropsychiatric   Examination of related systems reveals - The patient is well-nourished and well-groomed. Mental status exam performed with findings of - Oriented X3 with appropriate mood and affect.     Musculoskeletal  Normal      General Lymphatics  Normal           Medical problems and test results were reviewed with the patient today.     ASSESSMENT and PLAN    1. Well woman exam  2. Vaginal smear  -     PAP LB, Reflex HPV ASCUS  3. Vaginal atrophy  -     estradiol (ESTRACE VAGINAL) 0.1 MG/GM vaginal cream; Use one gram in the vagina 2x weekly at bedtime, Disp-42.5 g, R-5Normal             Chaperone utilized during exam    VEENA PARKS MD  2/22/2024

## 2024-02-29 LAB
COLLECTION METHOD: NORMAL
CYTOLOGIST CVX/VAG CYTO: NORMAL
CYTOLOGY CVX/VAG DOC THIN PREP: NORMAL
HPV REFLEX: NORMAL
Lab: NORMAL
OTHER PT INFO: NORMAL
PAP SOURCE: NORMAL
PATH REPORT.FINAL DX SPEC: NORMAL
PREV CYTO INFO: NEGATIVE
PREV TREATMENT RESULTS: NORMAL
PREV TREATMENT: NORMAL
STAT OF ADQ CVX/VAG CYTO-IMP: NORMAL

## 2024-06-25 ENCOUNTER — OFFICE VISIT (OUTPATIENT)
Age: 59
End: 2024-06-25
Payer: COMMERCIAL

## 2024-06-25 DIAGNOSIS — M79.641 BILATERAL HAND PAIN: Primary | ICD-10-CM

## 2024-06-25 DIAGNOSIS — M19.049 HAND ARTHRITIS: ICD-10-CM

## 2024-06-25 DIAGNOSIS — G56.03 BILATERAL CARPAL TUNNEL SYNDROME: ICD-10-CM

## 2024-06-25 DIAGNOSIS — M79.642 BILATERAL HAND PAIN: Primary | ICD-10-CM

## 2024-06-25 PROCEDURE — 99213 OFFICE O/P EST LOW 20 MIN: CPT | Performed by: ORTHOPAEDIC SURGERY

## 2024-06-25 NOTE — PROGRESS NOTES
Orthopaedic Hand Surgery Note    Name: Lora Dubon  YOB: 1965  Gender: female  MRN: [id number]    CC: hand numbness      HPI: Patient is a 59 y.o. female with a chief complaint of bilateral hand numbness and tingling in the median nerve distribution and stiffness particularly in the mornings.. The patient does not complain of night wakening but does have increased symptoms with driving.    ROS/Meds/PSH/PMH/FH/SH: I personally reviewed the patients standard intake form.  Pertinents are discussed In the HPI    Physical Examination:    Musculoskeletal:     Examination of the bilateral upper extremity demonstrates Normal sensation to light touch in the median distribution, normal sensation in ulnar and radial distribution, Positive carpal tunnel compression testing and Phalen testing, significantly positive tinels on the right, cap refill < 5 seconds in all fingers. Inspection reveals no thenar atrophy. Negative Tinel and elbow flexion compression test of the cubital tunnel, Sensation to light touch in the ulnar 2 digits is normal with no intrinsic atrophy/weakness. No tenderness to palpation or masses noted in the forearm.  There is tenderness to palpation at the right thumb CMC, positive CMC grind. Heberden's nodes are present at the DIP joints    Imaging / Electrodiagnostic Tests:     Hand XR: AP, Lateral, Oblique and Thumb CMC joint     Clinical Indication:  1. Bilateral hand pain    2. Bilateral carpal tunnel syndrome    3. Hand arthritis           Report: AP, lateral, oblique and thumb CMC joint x-ray of the bilateral hand demonstrates mild joint space narrowing, subluxation and osteophytic changes of the trapezium consistent with osteoarthritis of the thumb CMC joint.  There are severe degenerative changes diffusely throughout the DIP joints    Impression: as above     Rebekah Ballard MD         Assessment:     ICD-10-CM    1. Bilateral hand pain  M79.641 XR HAND BILATERAL MINIMUM 3 VW

## 2024-06-27 NOTE — PROGRESS NOTES
Patient was fitted and instructed on an  Wrist Splint for patients bilateral wrist. Patient is aware the hand slides in the brace with the thumb placed threw the thumb hole. I demonstrated the correct way to tighten the brace straps to allow for a comfortable fit. Patient understood the correct way to wear the brace.    Patient read and signed documenting they understand and agree to [facility]'s current DME return policy.

## 2024-10-22 ENCOUNTER — HOSPITAL ENCOUNTER (OUTPATIENT)
Dept: MAMMOGRAPHY | Age: 59
Discharge: HOME | End: 2024-10-25
Payer: COMMERCIAL

## 2024-10-22 DIAGNOSIS — Z12.31 VISIT FOR SCREENING MAMMOGRAM: ICD-10-CM

## 2024-10-22 PROCEDURE — 77063 BREAST TOMOSYNTHESIS BI: CPT

## 2025-03-31 ENCOUNTER — OFFICE VISIT (OUTPATIENT)
Age: 60
End: 2025-03-31
Payer: OTHER GOVERNMENT

## 2025-03-31 VITALS
DIASTOLIC BLOOD PRESSURE: 76 MMHG | HEART RATE: 61 BPM | SYSTOLIC BLOOD PRESSURE: 116 MMHG | BODY MASS INDEX: 30.05 KG/M2 | WEIGHT: 176 LBS | HEIGHT: 64 IN

## 2025-03-31 DIAGNOSIS — C7A.010 MALIGNANT CARCINOID TUMOR OF DUODENUM: ICD-10-CM

## 2025-03-31 DIAGNOSIS — R06.02 SHORTNESS OF BREATH: ICD-10-CM

## 2025-03-31 DIAGNOSIS — R07.9 CHEST PAIN, UNSPECIFIED TYPE: ICD-10-CM

## 2025-03-31 DIAGNOSIS — I47.10 PSVT (PAROXYSMAL SUPRAVENTRICULAR TACHYCARDIA): Primary | ICD-10-CM

## 2025-03-31 DIAGNOSIS — R93.1 AGATSTON CAC SCORE, <100: ICD-10-CM

## 2025-03-31 PROCEDURE — 93000 ELECTROCARDIOGRAM COMPLETE: CPT | Performed by: INTERNAL MEDICINE

## 2025-03-31 PROCEDURE — 99215 OFFICE O/P EST HI 40 MIN: CPT | Performed by: INTERNAL MEDICINE

## 2025-03-31 NOTE — PROGRESS NOTES
issues has been offered.  If patient wishes for adjustment of therapy which would include intensification of pharmacologic therapy for any above conditions this will be sent to appropriate pharmacy.  If patient politely declines any further changes they will be encouraged to continue with current treatment and appropriate risk factor modification and healthy lifestyle.      Pt is instructed to follow all appropriate cardiac risk factor recommendations and to be compliant with meds and testing. Instructed to follow up appropriately and seek urgent medical care if acute or unstable issues arise. Results of some tests may be viewed thru MyChart but this does not substitute for follow up with MD. If follow up is not scheduled pt is instructed to call for follow up.  Any non cardiac issues identified in this visit the patient is counseled to address these with their primary care physician or appropriate specialist.    WAYNE JAY MD

## 2025-05-06 ENCOUNTER — TELEPHONE (OUTPATIENT)
Age: 60
End: 2025-05-06

## 2025-05-07 ENCOUNTER — HOSPITAL ENCOUNTER (OUTPATIENT)
Dept: GENERAL RADIOLOGY | Age: 60
Discharge: HOME | End: 2025-05-09
Payer: OTHER GOVERNMENT

## 2025-05-07 DIAGNOSIS — R52 PAIN: ICD-10-CM
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
## 2025-05-07 PROCEDURE — 73565 X-RAY EXAM OF KNEES: CPT

## 2025-05-07 PROCEDURE — 70160 X-RAY EXAM OF NASAL BONES: CPT

## 2025-05-15 ENCOUNTER — TELEPHONE (OUTPATIENT)
Age: 60
End: 2025-05-15

## 2025-05-15 DIAGNOSIS — R55 SYNCOPE: ICD-10-CM

## 2025-05-15 DIAGNOSIS — R00.2 PALPITATIONS: Primary | ICD-10-CM

## 2025-05-15 NOTE — TELEPHONE ENCOUNTER
Pola Calvin MD  You8 minutes ago (2:35 PM)       Fit her with a 2-week monitor and we can see her shortly after the monitor is done.  Also get an echocardiogram

## 2025-05-15 NOTE — TELEPHONE ENCOUNTER
Had syncopal spell 5/6 while walking in neighborhood.She does not remember falling just woke up on the sidewalk.Heart has been fluttering.Her Mother passed away a few weeks ago.She can feel more palpitations/fluttering,lightheadeness off/on.These palpitations are not daily.Has not had any palpitations in last week.BP is always good or a little low.She missed her appt.5/13 by mistake.Wants to reschedule.She has an appt.7/24 does she need to be seen sooner?      Current Outpatient Medications on File Prior to Visit   Medication Sig Dispense Refill    levothyroxine (SYNTHROID) 112 MCG tablet       butalbital-acetaminophen-caffeine (FIORICET, ESGIC) -40 MG per tablet Take 1 tablet by mouth every 8 hours as needed (Patient not taking: Reported on 3/31/2025)      estradiol (ESTRACE VAGINAL) 0.1 MG/GM vaginal cream Use one gram in the vagina 2x weekly at bedtime (Patient not taking: Reported on 3/31/2025) 42.5 g 5    magnesium oxide (MAG-OX) 400 MG tablet Take 1 tablet by mouth daily      RABEprazole (ACIPHEX) 20 MG tablet       ergocalciferol (ERGOCALCIFEROL) 1.25 MG (46847 UT) capsule Take 1 capsule by mouth once a week      famotidine (PEPCID) 20 MG tablet daily (Patient not taking: Reported on 3/31/2025)      omeprazole (PRILOSEC) 40 MG delayed release capsule Take 1 capsule by mouth 2 times daily (Patient not taking: Reported on 3/31/2025)      LORazepam (ATIVAN) 1 MG tablet as needed.      acetaminophen (TYLENOL) 500 MG tablet Take 2 tablets by mouth every 6 hours as needed      alosetron (LOTRONEX) 0.5 MG tablet Take 1 tablet by mouth as needed      choline fenofibrate (TRILIPIX) 45 MG CPDR delayed release capsule Take 1 capsule by mouth      colestipol (COLESTID) 1 g tablet Take 1 tablet by mouth as needed      escitalopram (LEXAPRO) 10 MG tablet 1 tablet daily      ibuprofen (ADVIL;MOTRIN) 200 MG tablet Take 2 tablets by mouth every 6 hours as needed      levothyroxine (SYNTHROID) 100 MCG tablet Take 1

## 2025-05-15 NOTE — TELEPHONE ENCOUNTER
,  She just had an ECHO 4/8 does she need a repeat?    Pola Calvin MD  You6 minutes ago (2:52 PM)       no   I called and informed pt.of MD response.Appt.scheduled tomorrow for hoter placement and fu appt.made end of June after monitor

## 2025-05-15 NOTE — TELEPHONE ENCOUNTER
Patient called stating she has the following issues :    Fainting episode on 5/6  Broke nose  Wasn't out for long  Slight fluttering in heart

## 2025-06-24 ENCOUNTER — OFFICE VISIT (OUTPATIENT)
Age: 60
End: 2025-06-24
Payer: OTHER GOVERNMENT

## 2025-06-24 VITALS
SYSTOLIC BLOOD PRESSURE: 126 MMHG | HEART RATE: 64 BPM | WEIGHT: 173 LBS | BODY MASS INDEX: 29.53 KG/M2 | HEIGHT: 64 IN | DIASTOLIC BLOOD PRESSURE: 68 MMHG

## 2025-06-24 DIAGNOSIS — R93.1 AGATSTON CAC SCORE, <100: Primary | ICD-10-CM

## 2025-06-24 DIAGNOSIS — I20.9 ANGINA PECTORIS: ICD-10-CM

## 2025-06-24 DIAGNOSIS — R07.9 CHEST PAIN, UNSPECIFIED TYPE: ICD-10-CM

## 2025-06-24 DIAGNOSIS — I25.10 CORONARY ARTERY DISEASE INVOLVING NATIVE CORONARY ARTERY OF NATIVE HEART WITHOUT ANGINA PECTORIS: ICD-10-CM

## 2025-06-24 DIAGNOSIS — C7A.00 MALIGNANT CARCINOID TUMOR, UNSPECIFIED SITE (HCC): ICD-10-CM

## 2025-06-24 DIAGNOSIS — R06.02 SHORTNESS OF BREATH: ICD-10-CM

## 2025-06-24 PROCEDURE — 99214 OFFICE O/P EST MOD 30 MIN: CPT | Performed by: INTERNAL MEDICINE

## 2025-06-24 RX ORDER — ZOLPIDEM TARTRATE 10 MG/1
TABLET ORAL
COMMUNITY
Start: 2025-06-12

## 2025-06-24 NOTE — PROGRESS NOTES
[facility] CARDIOLOGY, [address], [address]  [place] [address]  PHONE: [phone]    SUBJECTIVE: /HPI  Lorameño Dbuon (1965) is a 60 y.o. female seen for a follow up visit regarding the following:   Specialty Problems          Cardiology Problems    PSVT (paroxysmal supraventricular tachycardia)        Splenic artery aneurysm        Precordial chest pain         Pt states improvement in chest pain but endorses continuation of palpitations. Denies any dyspnea of exertion. Pt walks a mile daily without any issues. Going to see a neurologist for headaches. Flutter sensation that occurs randomly occurs at rest or with exertion. Pt has lightlessness that sometimes occurs with palpitations and only lasting for a few seconds at a time. Denies any syncope or presyncope.     Patient unable to perform the treadmill portion of an imaging stress test previously ordered therefore we will order a pharmacologic imaging stress test    Past Medical History, Past Surgical History, Family history, Social History, and Medications were all reviewed with the patient today and updated as necessary.    Allergies   Allergen Reactions    Atorvastatin Other (See Comments)     Muscle cramping  Other reaction(s): Myalgia-Intolerance    Erythromycin Nausea And Vomiting     Other reaction(s): Nausea and/or vomiting-Intolerance    Penicillins Hives     Other reaction(s): Hives/Swelling-Allergy, Hives/Swelling-Allergy     Past Medical History:   Diagnosis Date    Asthma 1995    no inhalers needed since    Benign heart murmur     last echo 2012 LVEF 55-60% mild mitral regurgitation,4 mets    Cancer of duodenum (HCC) 2015    benign     Cervical dysplasia     Diabetes (HCC)     borderline- no meds    Fatty liver     GERD (gastroesophageal reflux disease)     seldom , tums or omeprazole prn    H/O wheezing     usually after a respiratory infection, uses prn inhaler    Heart palpitations     occassionally with no treatment

## (undated) DEVICE — NEEDLE HYPO 30GA L0.5IN BGE POLYPR HUB S STL REG BVL STR

## (undated) DEVICE — XEROFORM OCCLUSIVE GAUZE STRIP OVERWRAP, 3% BISMUTH TRIBROMOPHENATE IN PETROLATUM BLEND: Brand: XEROFORM

## (undated) DEVICE — SUT PROL 3-0 18IN PS2 BLU --

## (undated) DEVICE — BANDAGE,ELASTIC,ESMARK,STERILE,4"X9',LF: Brand: MEDLINE

## (undated) DEVICE — SYRINGE EAR 2OZ ULC SLIMMER TIP FLAT BTM SUCT PWR DISP FOR

## (undated) DEVICE — DRAPE, FILM SHEET, 44X65 STERILE: Brand: MEDLINE

## (undated) DEVICE — SYRINGE IRRIG 60ML SFT PLIABLE BLB EZ TO GRP 1 HND USE W/

## (undated) DEVICE — NEEDLE HYPO 25GA L1.5IN BLU POLYPR HUB S STL REG BVL STR

## (undated) DEVICE — ZIMMER® STERILE DISPOSABLE TOURNIQUET CUFF WITH PLC, DUAL PORT, SINGLE BLADDER, 18 IN. (46 CM)

## (undated) DEVICE — DRAPE TWL SURG 16X26IN BLU ORB04] ALLCARE INC]

## (undated) DEVICE — STERILE HOOK LOCK LATEX FREE ELASTIC BANDAGE 2INX5YD: Brand: HOOK LOCK™

## (undated) DEVICE — Device

## (undated) DEVICE — SUTURE VCRL SZ 6-0 L18IN ABSRB UD L8MM S-14.25 CIR SPAT REV J670G

## (undated) DEVICE — SURGICAL PROCEDURE PACK BASIC ST FRANCIS

## (undated) DEVICE — BLOCK BITE AD 60FR W/ VELC STRP ADDRESSES MOST PT AND

## (undated) DEVICE — (D)PREP SKN CHLRAPRP APPL 26ML -- CONVERT TO ITEM 371833

## (undated) DEVICE — CANNULA NSL ORAL AD FOR CAPNOFLEX CO2 O2 AIRLFE

## (undated) DEVICE — BANDAGE COMPR 9 FTX4 IN SMOOTH COMFORTABLE SYNTH ESMRK LF

## (undated) DEVICE — SUTURE ABSORBABLE MONOFILAMENT 6-0 G-1 18 IN PLN GUT 770G

## (undated) DEVICE — FORCEPS BX L240CM JAW DIA2.8MM L CAP W/ NDL MIC MESH TOOTH

## (undated) DEVICE — 1010 S-DRAPE TOWEL DRAPE 10/BX: Brand: STERI-DRAPE™

## (undated) DEVICE — SOLUTION IV 1000ML 0.9% SOD CHL

## (undated) DEVICE — APPLICATOR COT TIP 3IN WOOD --

## (undated) DEVICE — DRAPE,HAND,STERILE: Brand: MEDLINE

## (undated) DEVICE — DERMABOND SKIN ADH 0.7ML -- DERMABOND ADVANCED 12/BX

## (undated) DEVICE — CORD ELECSURG BPLR 12 FT DISP [810T818750] [ADLER INSTRUMENT CO]

## (undated) DEVICE — SMART SLEEVE SURGICAL GOWN, XL, POLYREINFORCED FRONT: Brand: CONVERTORS

## (undated) DEVICE — APPLICATOR BNDG 1MM ADH PREMIERPRO EXOFIN

## (undated) DEVICE — CONTAINER PREFIL FRMLN 40ML --

## (undated) DEVICE — DRAPE,U/SHT,SPLIT,FILM,60X84,STERILE: Brand: MEDLINE

## (undated) DEVICE — HAND PACK: Brand: MEDLINE INDUSTRIES, INC.

## (undated) DEVICE — PADDING CAST W2INXL4YD ST COT COHESIVE HND TEARABLE SPEC

## (undated) DEVICE — HEAD AND NECK: Brand: MEDLINE INDUSTRIES, INC.

## (undated) DEVICE — SUT SLK 6-0 18IN G1 DA BLK --

## (undated) DEVICE — DRAPE,TOP,102X53,STERILE: Brand: MEDLINE

## (undated) DEVICE — AMD ANTIMICROBIAL GAUZE SPONGES,12 PLY USP TYPE VII, 0.2% POLYHEXAMETHYLENE BIGUANIDE HCI (PHMB): Brand: CURITY

## (undated) DEVICE — BNDG,ELSTC,MATRIX,STRL,2"X5YD,LF,HOOK&LP: Brand: MEDLINE

## (undated) DEVICE — SUTURE ETHLN SZ 4-0 L18IN NONABSORBABLE BLK L19MM PS-2 3/8 1667H

## (undated) DEVICE — KENDALL RADIOLUCENT FOAM MONITORING ELECTRODE RECTANGULAR SHAPE: Brand: KENDALL

## (undated) DEVICE — RUBBERBAND FASTENING W0.25XL3.5IN 5 PER PK

## (undated) DEVICE — CONNECTOR TBNG OD5-7MM O2 END DISP

## (undated) DEVICE — DISPOSABLE BIPOLAR CODE, 12' (3.66 M): Brand: CONMED